# Patient Record
Sex: MALE | Race: WHITE | HISPANIC OR LATINO
[De-identification: names, ages, dates, MRNs, and addresses within clinical notes are randomized per-mention and may not be internally consistent; named-entity substitution may affect disease eponyms.]

---

## 2017-04-19 ENCOUNTER — APPOINTMENT (OUTPATIENT)
Dept: NEUROLOGY | Facility: CLINIC | Age: 82
End: 2017-04-19

## 2017-04-19 VITALS
HEIGHT: 60 IN | HEART RATE: 72 BPM | WEIGHT: 145 LBS | DIASTOLIC BLOOD PRESSURE: 73 MMHG | SYSTOLIC BLOOD PRESSURE: 108 MMHG | BODY MASS INDEX: 28.47 KG/M2

## 2017-04-19 DIAGNOSIS — G20 PARKINSON'S DISEASE: ICD-10-CM

## 2017-08-30 ENCOUNTER — INPATIENT (INPATIENT)
Facility: HOSPITAL | Age: 82
LOS: 4 days | Discharge: HOME | DRG: 690 | End: 2017-09-04
Attending: FAMILY MEDICINE | Admitting: FAMILY MEDICINE
Payer: MEDICARE

## 2017-08-30 VITALS
DIASTOLIC BLOOD PRESSURE: 76 MMHG | TEMPERATURE: 98 F | SYSTOLIC BLOOD PRESSURE: 130 MMHG | RESPIRATION RATE: 17 BRPM | HEART RATE: 86 BPM | OXYGEN SATURATION: 99 % | WEIGHT: 145.06 LBS

## 2017-08-30 DIAGNOSIS — Z90.49 ACQUIRED ABSENCE OF OTHER SPECIFIED PARTS OF DIGESTIVE TRACT: Chronic | ICD-10-CM

## 2017-08-30 RX ORDER — SODIUM CHLORIDE 9 MG/ML
3 INJECTION INTRAMUSCULAR; INTRAVENOUS; SUBCUTANEOUS ONCE
Qty: 0 | Refills: 0 | Status: COMPLETED | OUTPATIENT
Start: 2017-08-30 | End: 2017-08-30

## 2017-08-30 RX ORDER — PANTOPRAZOLE SODIUM 20 MG/1
40 TABLET, DELAYED RELEASE ORAL ONCE
Qty: 0 | Refills: 0 | Status: COMPLETED | OUTPATIENT
Start: 2017-08-30 | End: 2017-08-30

## 2017-08-30 NOTE — ED ADULT NURSE NOTE - PMH
Abdominal pain    Asbestos-induced pleural plaque    BPH (benign prostatic hyperplasia)    Calcified granuloma of lung    Constipation    Coronary artery disease due to calcified coronary lesion    Essential hypertension    Fall, subsequent encounter    HLD (hyperlipidemia)    Parkinsons disease

## 2017-08-31 DIAGNOSIS — N39.0 URINARY TRACT INFECTION, SITE NOT SPECIFIED: ICD-10-CM

## 2017-08-31 DIAGNOSIS — N40.0 BENIGN PROSTATIC HYPERPLASIA WITHOUT LOWER URINARY TRACT SYMPTOMS: ICD-10-CM

## 2017-08-31 DIAGNOSIS — K59.00 CONSTIPATION, UNSPECIFIED: ICD-10-CM

## 2017-08-31 DIAGNOSIS — Z29.9 ENCOUNTER FOR PROPHYLACTIC MEASURES, UNSPECIFIED: ICD-10-CM

## 2017-08-31 DIAGNOSIS — E78.5 HYPERLIPIDEMIA, UNSPECIFIED: ICD-10-CM

## 2017-08-31 DIAGNOSIS — G20 PARKINSON'S DISEASE: ICD-10-CM

## 2017-08-31 DIAGNOSIS — I25.10 ATHEROSCLEROTIC HEART DISEASE OF NATIVE CORONARY ARTERY WITHOUT ANGINA PECTORIS: ICD-10-CM

## 2017-08-31 DIAGNOSIS — R53.1 WEAKNESS: ICD-10-CM

## 2017-08-31 LAB
ALBUMIN SERPL ELPH-MCNC: 3.4 G/DL — LOW (ref 3.5–5)
ALP SERPL-CCNC: 95 U/L — SIGNIFICANT CHANGE UP (ref 40–120)
ALT FLD-CCNC: 11 U/L DA — SIGNIFICANT CHANGE UP (ref 10–60)
ANION GAP SERPL CALC-SCNC: 7 MMOL/L — SIGNIFICANT CHANGE UP (ref 5–17)
APPEARANCE UR: CLEAR — SIGNIFICANT CHANGE UP
AST SERPL-CCNC: 17 U/L — SIGNIFICANT CHANGE UP (ref 10–40)
BASOPHILS # BLD AUTO: 0.1 K/UL — SIGNIFICANT CHANGE UP (ref 0–0.2)
BASOPHILS NFR BLD AUTO: 0.8 % — SIGNIFICANT CHANGE UP (ref 0–2)
BILIRUB SERPL-MCNC: 0.3 MG/DL — SIGNIFICANT CHANGE UP (ref 0.2–1.2)
BILIRUB UR-MCNC: NEGATIVE — SIGNIFICANT CHANGE UP
BUN SERPL-MCNC: 28 MG/DL — HIGH (ref 7–18)
CALCIUM SERPL-MCNC: 8.5 MG/DL — SIGNIFICANT CHANGE UP (ref 8.4–10.5)
CHLORIDE SERPL-SCNC: 107 MMOL/L — SIGNIFICANT CHANGE UP (ref 96–108)
CO2 SERPL-SCNC: 28 MMOL/L — SIGNIFICANT CHANGE UP (ref 22–31)
COLOR SPEC: YELLOW — SIGNIFICANT CHANGE UP
CREAT SERPL-MCNC: 0.96 MG/DL — SIGNIFICANT CHANGE UP (ref 0.5–1.3)
DIFF PNL FLD: ABNORMAL
EOSINOPHIL # BLD AUTO: 0.2 K/UL — SIGNIFICANT CHANGE UP (ref 0–0.5)
EOSINOPHIL NFR BLD AUTO: 3.3 % — SIGNIFICANT CHANGE UP (ref 0–6)
GLUCOSE SERPL-MCNC: 100 MG/DL — HIGH (ref 70–99)
GLUCOSE UR QL: NEGATIVE — SIGNIFICANT CHANGE UP
HCT VFR BLD CALC: 43 % — SIGNIFICANT CHANGE UP (ref 39–50)
HGB BLD-MCNC: 14.2 G/DL — SIGNIFICANT CHANGE UP (ref 13–17)
KETONES UR-MCNC: ABNORMAL
LACTATE SERPL-SCNC: 1.7 MMOL/L — SIGNIFICANT CHANGE UP (ref 0.7–2)
LEUKOCYTE ESTERASE UR-ACNC: ABNORMAL
LIDOCAIN IGE QN: 178 U/L — SIGNIFICANT CHANGE UP (ref 73–393)
LYMPHOCYTES # BLD AUTO: 2.4 K/UL — SIGNIFICANT CHANGE UP (ref 1–3.3)
LYMPHOCYTES # BLD AUTO: 37.4 % — SIGNIFICANT CHANGE UP (ref 13–44)
MCHC RBC-ENTMCNC: 32.8 PG — SIGNIFICANT CHANGE UP (ref 27–34)
MCHC RBC-ENTMCNC: 33 GM/DL — SIGNIFICANT CHANGE UP (ref 32–36)
MCV RBC AUTO: 99.4 FL — SIGNIFICANT CHANGE UP (ref 80–100)
MONOCYTES # BLD AUTO: 0.7 K/UL — SIGNIFICANT CHANGE UP (ref 0–0.9)
MONOCYTES NFR BLD AUTO: 10.9 % — SIGNIFICANT CHANGE UP (ref 2–14)
NEUTROPHILS # BLD AUTO: 3 K/UL — SIGNIFICANT CHANGE UP (ref 1.8–7.4)
NEUTROPHILS NFR BLD AUTO: 47.7 % — SIGNIFICANT CHANGE UP (ref 43–77)
NITRITE UR-MCNC: NEGATIVE — SIGNIFICANT CHANGE UP
PH UR: 6.5 — SIGNIFICANT CHANGE UP (ref 5–8)
PLATELET # BLD AUTO: 171 K/UL — SIGNIFICANT CHANGE UP (ref 150–400)
POTASSIUM SERPL-MCNC: 4.7 MMOL/L — SIGNIFICANT CHANGE UP (ref 3.5–5.3)
POTASSIUM SERPL-SCNC: 4.7 MMOL/L — SIGNIFICANT CHANGE UP (ref 3.5–5.3)
PROT SERPL-MCNC: 7.2 G/DL — SIGNIFICANT CHANGE UP (ref 6–8.3)
PROT UR-MCNC: 15
RBC # BLD: 4.32 M/UL — SIGNIFICANT CHANGE UP (ref 4.2–5.8)
RBC # FLD: 11.4 % — SIGNIFICANT CHANGE UP (ref 10.3–14.5)
SODIUM SERPL-SCNC: 142 MMOL/L — SIGNIFICANT CHANGE UP (ref 135–145)
SP GR SPEC: 1.01 — SIGNIFICANT CHANGE UP (ref 1.01–1.02)
TROPONIN I SERPL-MCNC: <0.015 NG/ML — SIGNIFICANT CHANGE UP (ref 0–0.04)
TROPONIN I SERPL-MCNC: <0.015 NG/ML — SIGNIFICANT CHANGE UP (ref 0–0.04)
UROBILINOGEN FLD QL: NEGATIVE — SIGNIFICANT CHANGE UP
WBC # BLD: 6.4 K/UL — SIGNIFICANT CHANGE UP (ref 3.8–10.5)
WBC # FLD AUTO: 6.4 K/UL — SIGNIFICANT CHANGE UP (ref 3.8–10.5)

## 2017-08-31 PROCEDURE — 99285 EMERGENCY DEPT VISIT HI MDM: CPT | Mod: 25

## 2017-08-31 PROCEDURE — 74177 CT ABD & PELVIS W/CONTRAST: CPT | Mod: 26

## 2017-08-31 RX ORDER — PREGABALIN 225 MG/1
1000 CAPSULE ORAL DAILY
Qty: 0 | Refills: 0 | Status: DISCONTINUED | OUTPATIENT
Start: 2017-08-31 | End: 2017-09-04

## 2017-08-31 RX ORDER — CARBIDOPA AND LEVODOPA 25; 100 MG/1; MG/1
1 TABLET ORAL EVERY 6 HOURS
Qty: 0 | Refills: 0 | Status: DISCONTINUED | OUTPATIENT
Start: 2017-08-31 | End: 2017-09-01

## 2017-08-31 RX ORDER — METOPROLOL TARTRATE 50 MG
25 TABLET ORAL
Qty: 0 | Refills: 0 | Status: DISCONTINUED | OUTPATIENT
Start: 2017-08-31 | End: 2017-09-04

## 2017-08-31 RX ORDER — METOPROLOL TARTRATE 50 MG
1 TABLET ORAL
Qty: 0 | Refills: 0 | COMMUNITY

## 2017-08-31 RX ORDER — CARBIDOPA AND LEVODOPA 25; 100 MG/1; MG/1
1 TABLET ORAL
Qty: 0 | Refills: 0 | COMMUNITY

## 2017-08-31 RX ORDER — LACTULOSE 10 G/15ML
10 SOLUTION ORAL DAILY
Qty: 0 | Refills: 0 | Status: DISCONTINUED | OUTPATIENT
Start: 2017-08-31 | End: 2017-08-31

## 2017-08-31 RX ORDER — SENNA PLUS 8.6 MG/1
2 TABLET ORAL AT BEDTIME
Qty: 0 | Refills: 0 | Status: DISCONTINUED | OUTPATIENT
Start: 2017-08-31 | End: 2017-09-04

## 2017-08-31 RX ORDER — SODIUM CHLORIDE 9 MG/ML
1000 INJECTION INTRAMUSCULAR; INTRAVENOUS; SUBCUTANEOUS ONCE
Qty: 0 | Refills: 0 | Status: COMPLETED | OUTPATIENT
Start: 2017-08-31 | End: 2017-08-31

## 2017-08-31 RX ORDER — CEFTRIAXONE 500 MG/1
1 INJECTION, POWDER, FOR SOLUTION INTRAMUSCULAR; INTRAVENOUS EVERY 24 HOURS
Qty: 0 | Refills: 0 | Status: DISCONTINUED | OUTPATIENT
Start: 2017-08-31 | End: 2017-09-01

## 2017-08-31 RX ORDER — DULOXETINE HYDROCHLORIDE 30 MG/1
30 CAPSULE, DELAYED RELEASE ORAL DAILY
Qty: 0 | Refills: 0 | Status: DISCONTINUED | OUTPATIENT
Start: 2017-08-31 | End: 2017-09-04

## 2017-08-31 RX ORDER — LACTULOSE 10 G/15ML
10 SOLUTION ORAL DAILY
Qty: 0 | Refills: 0 | Status: DISCONTINUED | OUTPATIENT
Start: 2017-08-31 | End: 2017-09-04

## 2017-08-31 RX ORDER — DOCUSATE SODIUM 100 MG
100 CAPSULE ORAL
Qty: 0 | Refills: 0 | Status: DISCONTINUED | OUTPATIENT
Start: 2017-08-31 | End: 2017-09-04

## 2017-08-31 RX ORDER — ENOXAPARIN SODIUM 100 MG/ML
40 INJECTION SUBCUTANEOUS EVERY 24 HOURS
Qty: 0 | Refills: 0 | Status: DISCONTINUED | OUTPATIENT
Start: 2017-08-31 | End: 2017-09-04

## 2017-08-31 RX ORDER — SODIUM CHLORIDE 9 MG/ML
1000 INJECTION INTRAMUSCULAR; INTRAVENOUS; SUBCUTANEOUS
Qty: 0 | Refills: 0 | Status: DISCONTINUED | OUTPATIENT
Start: 2017-08-31 | End: 2017-09-04

## 2017-08-31 RX ORDER — ATORVASTATIN CALCIUM 80 MG/1
20 TABLET, FILM COATED ORAL AT BEDTIME
Qty: 0 | Refills: 0 | Status: DISCONTINUED | OUTPATIENT
Start: 2017-08-31 | End: 2017-09-04

## 2017-08-31 RX ORDER — CEFTRIAXONE 500 MG/1
1 INJECTION, POWDER, FOR SOLUTION INTRAMUSCULAR; INTRAVENOUS ONCE
Qty: 0 | Refills: 0 | Status: COMPLETED | OUTPATIENT
Start: 2017-08-31 | End: 2017-08-31

## 2017-08-31 RX ORDER — FAMOTIDINE 10 MG/ML
20 INJECTION INTRAVENOUS
Qty: 0 | Refills: 0 | Status: DISCONTINUED | OUTPATIENT
Start: 2017-08-31 | End: 2017-09-04

## 2017-08-31 RX ORDER — FOLIC ACID 0.8 MG
1 TABLET ORAL DAILY
Qty: 0 | Refills: 0 | Status: DISCONTINUED | OUTPATIENT
Start: 2017-08-31 | End: 2017-09-04

## 2017-08-31 RX ORDER — ALPRAZOLAM 0.25 MG
0.25 TABLET ORAL THREE TIMES A DAY
Qty: 0 | Refills: 0 | Status: DISCONTINUED | OUTPATIENT
Start: 2017-08-31 | End: 2017-09-04

## 2017-08-31 RX ORDER — METOPROLOL TARTRATE 50 MG
25 TABLET ORAL
Qty: 0 | Refills: 0 | Status: DISCONTINUED | OUTPATIENT
Start: 2017-08-31 | End: 2017-08-31

## 2017-08-31 RX ORDER — MORPHINE SULFATE 50 MG/1
2 CAPSULE, EXTENDED RELEASE ORAL ONCE
Qty: 0 | Refills: 0 | Status: DISCONTINUED | OUTPATIENT
Start: 2017-08-31 | End: 2017-08-31

## 2017-08-31 RX ADMIN — SODIUM CHLORIDE 60 MILLILITER(S): 9 INJECTION INTRAMUSCULAR; INTRAVENOUS; SUBCUTANEOUS at 08:14

## 2017-08-31 RX ADMIN — LACTULOSE 10 GRAM(S): 10 SOLUTION ORAL at 12:01

## 2017-08-31 RX ADMIN — FAMOTIDINE 20 MILLIGRAM(S): 10 INJECTION INTRAVENOUS at 19:01

## 2017-08-31 RX ADMIN — MORPHINE SULFATE 2 MILLIGRAM(S): 50 CAPSULE, EXTENDED RELEASE ORAL at 00:34

## 2017-08-31 RX ADMIN — FAMOTIDINE 20 MILLIGRAM(S): 10 INJECTION INTRAVENOUS at 06:15

## 2017-08-31 RX ADMIN — Medication 25 MILLIGRAM(S): at 06:15

## 2017-08-31 RX ADMIN — ENOXAPARIN SODIUM 40 MILLIGRAM(S): 100 INJECTION SUBCUTANEOUS at 06:15

## 2017-08-31 RX ADMIN — SENNA PLUS 2 TABLET(S): 8.6 TABLET ORAL at 21:14

## 2017-08-31 RX ADMIN — CEFTRIAXONE 100 GRAM(S): 500 INJECTION, POWDER, FOR SOLUTION INTRAMUSCULAR; INTRAVENOUS at 02:30

## 2017-08-31 RX ADMIN — ATORVASTATIN CALCIUM 20 MILLIGRAM(S): 80 TABLET, FILM COATED ORAL at 21:14

## 2017-08-31 RX ADMIN — MORPHINE SULFATE 2 MILLIGRAM(S): 50 CAPSULE, EXTENDED RELEASE ORAL at 02:23

## 2017-08-31 RX ADMIN — SODIUM CHLORIDE 3 MILLILITER(S): 9 INJECTION INTRAMUSCULAR; INTRAVENOUS; SUBCUTANEOUS at 00:32

## 2017-08-31 RX ADMIN — CARBIDOPA AND LEVODOPA 1 TABLET(S): 25; 100 TABLET ORAL at 18:15

## 2017-08-31 RX ADMIN — DULOXETINE HYDROCHLORIDE 30 MILLIGRAM(S): 30 CAPSULE, DELAYED RELEASE ORAL at 12:00

## 2017-08-31 RX ADMIN — CEFTRIAXONE 100 GRAM(S): 500 INJECTION, POWDER, FOR SOLUTION INTRAMUSCULAR; INTRAVENOUS at 08:15

## 2017-08-31 RX ADMIN — SODIUM CHLORIDE 60 MILLILITER(S): 9 INJECTION INTRAMUSCULAR; INTRAVENOUS; SUBCUTANEOUS at 18:13

## 2017-08-31 RX ADMIN — PANTOPRAZOLE SODIUM 40 MILLIGRAM(S): 20 TABLET, DELAYED RELEASE ORAL at 00:34

## 2017-08-31 RX ADMIN — CARBIDOPA AND LEVODOPA 1 TABLET(S): 25; 100 TABLET ORAL at 12:01

## 2017-08-31 RX ADMIN — PREGABALIN 1000 MICROGRAM(S): 225 CAPSULE ORAL at 12:00

## 2017-08-31 RX ADMIN — CARBIDOPA AND LEVODOPA 1 TABLET(S): 25; 100 TABLET ORAL at 06:15

## 2017-08-31 RX ADMIN — Medication 100 MILLIGRAM(S): at 06:15

## 2017-08-31 RX ADMIN — SODIUM CHLORIDE 3000 MILLILITER(S): 9 INJECTION INTRAMUSCULAR; INTRAVENOUS; SUBCUTANEOUS at 03:24

## 2017-08-31 RX ADMIN — Medication 100 MILLIGRAM(S): at 18:14

## 2017-08-31 RX ADMIN — SODIUM CHLORIDE 60 MILLILITER(S): 9 INJECTION INTRAMUSCULAR; INTRAVENOUS; SUBCUTANEOUS at 12:01

## 2017-08-31 RX ADMIN — Medication 25 MILLIGRAM(S): at 18:14

## 2017-08-31 RX ADMIN — Medication 1 MILLIGRAM(S): at 12:01

## 2017-08-31 NOTE — ED PROVIDER NOTE - CARE PLAN
Principal Discharge DX:	Urinary tract infection without hematuria, site unspecified  Secondary Diagnosis:	Generalized weakness  Secondary Diagnosis:	Constipation, unspecified constipation type

## 2017-08-31 NOTE — H&P ADULT - HISTORY OF PRESENT ILLNESS
Pt is an 86 year old male with PMH significant for HLD, Parkinson's, anxiety, constipation and PSHx of cholecystectomy presents to ED c/o abd pain  for 2 days. Pt describes it in the lower abdominal region as a cramping pain, and states he has constipation with his last BM 2 days ago. According to wife, she gave him milk of magnesia with no improvement of symptoms. Today, pt states he became very weak. He usually walks w/ cane but was unable to stand up on own today. Denies any fever, headache, nausea, vomiting, diarrhea, shortness of breath Pt is an 86 year old male with PMH significant for HLD, Parkinson's, anxiety, constipation and PSHx of cholecystectomy presents to ED c/o abd pain  for 2 days. Pt describes it in the lower abdominal region as a cramping pain, and states he has constipation with his last BM 2 days ago. According to wife, she gave him milk of magnesia with no improvement of symptoms. Today, pt states he became very weak. He usually walks w/ cane but was unable to stand up on own today. Denies any fever, headache, nausea, vomiting, diarrhea, shortness of breath.    In ED, received 1 L NS bolus and s/p 1 dose of rocephin Pt is an 86 year old male with PMH significant for HLD, Parkinson's, anxiety, BPH, constipation and PSHx of cholecystectomy presents to ED c/o abd pain  for 2 days. Pt describes it in the lower abdominal region as a cramping pain, and states he has constipation with his last BM 2 days ago. According to wife, she gave him milk of magnesia with no improvement of symptoms. Today, pt states he became very weak. He usually walks w/ cane but was unable to stand up on own today. Denies any fever, headache, nausea, vomiting, diarrhea, shortness of breath.    In ED, received 1 L NS bolus and s/p 1 dose of rocephin

## 2017-08-31 NOTE — PROGRESS NOTE ADULT - SUBJECTIVE AND OBJECTIVE BOX
Patient is a 86y old  Male who presents with a chief complaint of abdominal pain for 2 days (31 Aug 2017 04:40)      INTERVAL HPI/OVERNIGHT EVENTS: no acute overnight events. On encounter patient states that abdominal pain is decreasing. Family at bedside.     I&O's Summary    Vital Signs Last 24 Hrs  T(C): 36.2 (31 Aug 2017 11:39), Max: 36.9 (31 Aug 2017 05:58)  T(F): 97.2 (31 Aug 2017 11:39), Max: 98.5 (31 Aug 2017 05:58)  HR: 101 (31 Aug 2017 11:39) (86 - 101)  BP: 149/81 (31 Aug 2017 11:39) (130/76 - 149/81)  BP(mean): --  RR: 17 (31 Aug 2017 11:39) (17 - 17)  SpO2: 98% (31 Aug 2017 11:39) (98% - 99%)  PAST MEDICAL & SURGICAL HISTORY:  Abdominal pain  Calcified granuloma of lung  Asbestos-induced pleural plaque  Constipation  BPH (benign prostatic hyperplasia)  Coronary artery disease due to calcified coronary lesion  HLD (hyperlipidemia)  Fall, subsequent encounter  Essential hypertension  Parkinsons disease  History of cholecystectomy      SOCIAL HISTORY  Alcohol:  Tobacco:  Illicit substance use:      FAMILY HISTORY:      LABS:                        14.2   6.4   )-----------( 171      ( 31 Aug 2017 00:25 )             43.0     08-31    142  |  107  |  28<H>  ----------------------------<  100<H>  4.7   |  28  |  0.96    Ca    8.5      31 Aug 2017 00:25    TPro  7.2  /  Alb  3.4<L>  /  TBili  0.3  /  DBili  x   /  AST  17  /  ALT  11  /  AlkPhos  95  08-31      CAPILLARY BLOOD GLUCOSE  119 (31 Aug 2017 11:39)            Urinalysis Basic - ( 31 Aug 2017 00:25 )    Color: Yellow / Appearance: Clear / S.015 / pH: x  Gluc: x / Ketone: Trace  / Bili: Negative / Urobili: Negative   Blood: x / Protein: 15 / Nitrite: Negative   Leuk Esterase: Moderate / RBC: 2-5 /HPF / WBC 26-50 /HPF   Sq Epi: x / Non Sq Epi: Few / Bacteria: Few /HPF        MEDICATIONS  (STANDING):  sodium chloride 0.9%. 1000 milliLiter(s) (60 mL/Hr) IV Continuous <Continuous>  DULoxetine 30 milliGRAM(s) Oral daily  atorvastatin 20 milliGRAM(s) Oral at bedtime  carbidopa/levodopa CR 50/200 1 Tablet(s) Oral every 6 hours  famotidine    Tablet 20 milliGRAM(s) Oral two times a day  docusate sodium 100 milliGRAM(s) Oral two times a day  senna 2 Tablet(s) Oral at bedtime  cyanocobalamin 1000 MICROGram(s) Oral daily  folic acid 1 milliGRAM(s) Oral daily  enoxaparin Injectable 40 milliGRAM(s) SubCutaneous every 24 hours  metoprolol succinate ER 25 milliGRAM(s) Oral two times a day  cefTRIAXone   IVPB 1 Gram(s) IV Intermittent every 24 hours  lactulose Syrup 10 Gram(s) Oral daily    MEDICATIONS  (PRN):  ALPRAZolam 0.25 milliGRAM(s) Oral three times a day PRN anxiety/agitation      REVIEW OF SYSTEMS:  CONSTITUTIONAL: No fever, weight loss, or fatigue  EYES: No eye pain, visual disturbances, or discharge  ENMT:  No difficulty hearing, tinnitus, vertigo; No sinus or throat pain  NECK: No pain or stiffness  RESPIRATORY: No cough, wheezing, chills or hemoptysis; No shortness of breath  CARDIOVASCULAR: No chest pain, palpitations, dizziness, or leg swelling  GASTROINTESTINAL: No abdominal or epigastric pain. No nausea, vomiting, or hematemesis; No diarrhea or constipation. No melena or hematochezia.  GENITOURINARY: No dysuria, frequency, hematuria, or incontinence  NEUROLOGICAL: No headaches, memory loss, loss of strength, numbness, or tremors  SKIN: No itching, burning, rashes, or lesions   LYMPH NODES: No enlarged glands  ENDOCRINE: No heat or cold intolerance; No hair loss  MUSCULOSKELETAL: No joint pain or swelling; No muscle, back, or extremity pain  PSYCHIATRIC: No depression, anxiety, mood swings, or difficulty sleeping  HEME/LYMPH: No easy bruising, or bleeding gums  ALLERGY AND IMMUNOLOGIC: No hives or eczema    RADIOLOGY & ADDITIONAL TESTS:    Imaging Personally Reviewed:  [ ] YES  [ ] NO    Consultant(s) Notes Reviewed:  [ ] YES  [ ] NO    PHYSICAL EXAM:  GENERAL: NAD, well-groomed, well-developed  HEAD:  Atraumatic, Normocephalic  EYES: EOMI, PERRLA, conjunctiva and sclera clear  ENMT: No tonsillar erythema, exudates, or enlargement; Moist mucous membranes, Good dentition, No lesions  NECK: Supple, No JVD, Normal thyroid  NERVOUS SYSTEM:  Alert & Oriented X3, Good concentration; Motor Strength 5/5 B/L upper and lower extremities; DTRs 2+ intact and symmetric  CHEST/LUNG: Clear to percussion bilaterally; No rales, rhonchi, wheezing, or rubs  HEART: Regular rate and rhythm; No murmurs, rubs, or gallops  ABDOMEN: Soft, Nontender, Nondistended; Bowel sounds present  EXTREMITIES:  2+ Peripheral Pulses, No clubbing, cyanosis, or edema  LYMPH: No lymphadenopathy noted  SKIN: No rashes or lesions    Care Collaborated Discussed with Consultants/Other Providers [ ] YES  [ ] NO Patient is a 86y old  Male who presents with a chief complaint of abdominal pain for 2 days (31 Aug 2017 04:40)      INTERVAL HPI/OVERNIGHT EVENTS: no acute overnight events. On encounter patient states that abdominal pain is decreasing. Family at bedside.     I&O's Summary    Vital Signs Last 24 Hrs  T(C): 36.2 (31 Aug 2017 11:39), Max: 36.9 (31 Aug 2017 05:58)  T(F): 97.2 (31 Aug 2017 11:39), Max: 98.5 (31 Aug 2017 05:58)  HR: 101 (31 Aug 2017 11:39) (86 - 101)  BP: 149/81 (31 Aug 2017 11:39) (130/76 - 149/81)  BP(mean): --  RR: 17 (31 Aug 2017 11:39) (17 - 17)  SpO2: 98% (31 Aug 2017 11:39) (98% - 99%)  PAST MEDICAL & SURGICAL HISTORY:  Abdominal pain  Calcified granuloma of lung  Asbestos-induced pleural plaque  Constipation  BPH (benign prostatic hyperplasia)  Coronary artery disease due to calcified coronary lesion  HLD (hyperlipidemia)  Fall, subsequent encounter  Essential hypertension  Parkinsons disease  History of cholecystectomy        LABS:                        14.2   6.4   )-----------( 171      ( 31 Aug 2017 00:25 )             43.0     08-31    142  |  107  |  28<H>  ----------------------------<  100<H>  4.7   |  28  |  0.96    Ca    8.5      31 Aug 2017 00:25    TPro  7.2  /  Alb  3.4<L>  /  TBili  0.3  /  DBili  x   /  AST  17  /  ALT  11  /  AlkPhos  95  08-31      CAPILLARY BLOOD GLUCOSE  119 (31 Aug 2017 11:39)            Urinalysis Basic - ( 31 Aug 2017 00:25 )    Color: Yellow / Appearance: Clear / S.015 / pH: x  Gluc: x / Ketone: Trace  / Bili: Negative / Urobili: Negative   Blood: x / Protein: 15 / Nitrite: Negative   Leuk Esterase: Moderate / RBC: 2-5 /HPF / WBC 26-50 /HPF   Sq Epi: x / Non Sq Epi: Few / Bacteria: Few /HPF        MEDICATIONS  (STANDING):  sodium chloride 0.9%. 1000 milliLiter(s) (60 mL/Hr) IV Continuous <Continuous>  DULoxetine 30 milliGRAM(s) Oral daily  atorvastatin 20 milliGRAM(s) Oral at bedtime  carbidopa/levodopa CR 50/200 1 Tablet(s) Oral every 6 hours  famotidine    Tablet 20 milliGRAM(s) Oral two times a day  docusate sodium 100 milliGRAM(s) Oral two times a day  senna 2 Tablet(s) Oral at bedtime  cyanocobalamin 1000 MICROGram(s) Oral daily  folic acid 1 milliGRAM(s) Oral daily  enoxaparin Injectable 40 milliGRAM(s) SubCutaneous every 24 hours  metoprolol succinate ER 25 milliGRAM(s) Oral two times a day  cefTRIAXone   IVPB 1 Gram(s) IV Intermittent every 24 hours  lactulose Syrup 10 Gram(s) Oral daily    MEDICATIONS  (PRN):  ALPRAZolam 0.25 milliGRAM(s) Oral three times a day PRN anxiety/agitation      REVIEW OF SYSTEMS:  CONSTITUTIONAL: No fever, weight loss, or fatigue  EYES: No eye pain, visual disturbances, or discharge  ENMT:  No difficulty hearing, tinnitus, vertigo; No sinus or throat pain  NECK: No pain or stiffness  RESPIRATORY: No cough, wheezing, chills or hemoptysis; No shortness of breath  CARDIOVASCULAR: No chest pain, palpitations, dizziness, or leg swelling  GASTROINTESTINAL: +abdominal pain  No nausea, vomiting, or hematemesis; No diarrhea or constipation. No melena or hematochezia.  GENITOURINARY: No dysuria, frequency, hematuria, or incontinence  NEUROLOGICAL: No headaches, memory loss, loss of strength, numbness, or tremors  SKIN: No itching, burning, rashes, or lesions   LYMPH NODES: No enlarged glands  ENDOCRINE: No heat or cold intolerance; No hair loss  MUSCULOSKELETAL: No joint pain or swelling; No muscle, back, or extremity pain  PSYCHIATRIC: No depression, anxiety, mood swings, or difficulty sleeping  HEME/LYMPH: No easy bruising, or bleeding gums  ALLERGY AND IMMUNOLOGIC: No hives or eczema    RADIOLOGY & ADDITIONAL TESTS:    Imaging Personally Reviewed:  [ ] YES  [ ] NO    Consultant(s) Notes Reviewed:  [ ] YES  [ ] NO    PHYSICAL EXAM:  GENERAL: NAD, well-groomed, well-developed  HEAD:  Atraumatic, Normocephalic  EYES: EOMI, PERRLA, conjunctiva and sclera clear  ENMT: No tonsillar erythema, exudates, or enlargement; Moist mucous membranes, Good dentition, No lesions  NECK: Supple, No JVD, Normal thyroid  NERVOUS SYSTEM:  Alert & Oriented X3, Good concentration; Motor Strength 5/5 B/L upper and lower extremities; DTRs 2+ intact and symmetric  CHEST/LUNG: Clear to percussion bilaterally; No rales, rhonchi, wheezing, or rubs  HEART: Regular rate and rhythm; No murmurs, rubs, or gallops  ABDOMEN: Soft, Nontender, Nondistended; Bowel sounds present  EXTREMITIES:  2+ Peripheral Pulses, No clubbing, cyanosis, or edema  LYMPH: No lymphadenopathy noted  SKIN: No rashes or lesions    Care Collaborated Discussed with Consultants/Other Providers [ ] YES  [ ] NO

## 2017-08-31 NOTE — H&P ADULT - PROBLEM SELECTOR PLAN 2
continue with senna, colace,  will give enema continue with senna, colace, lactulose  tap water enema STAT  may also help with symptoms of UTI

## 2017-08-31 NOTE — ED PROVIDER NOTE - PROGRESS NOTE DETAILS
labs show wbc wnl, trop wnl  UA cw UTI-given ceftriaxone  CT A/P shows moderate stool throughout colon, no acute abnormality

## 2017-08-31 NOTE — ED PROVIDER NOTE - OBJECTIVE STATEMENT
85 y/o M w/ PMHx of HLD, Parkinson's, anxiety, constipation and PSHx of cholecystectomy presents to ED c/o abd pain x earlier today. Denies any fever, vomiting, or diarrhea. Pt states last BM was 2 days ago. Wife reports she gave him milk of magnesia w/ no improvement of constipation. Today, pt states he became very weak. He usually walks w/ cane but was unable to stand up on own today. NKDA.

## 2017-08-31 NOTE — H&P ADULT - NSHPLABSRESULTS_GEN_ALL_CORE
LABS:      CBC Full  -  ( 31 Aug 2017 00:25 )  WBC Count : 6.4 K/uL  Hemoglobin : 14.2 g/dL  Hematocrit : 43.0 %  Platelet Count - Automated : 171 K/uL  Mean Cell Volume : 99.4 fl  Mean Cell Hemoglobin : 32.8 pg  Mean Cell Hemoglobin Concentration : 33.0 gm/dL  Auto Neutrophil # : 3.0 K/uL  Auto Lymphocyte # : 2.4 K/uL  Auto Monocyte # : 0.7 K/uL  Auto Eosinophil # : 0.2 K/uL  Auto Basophil # : 0.1 K/uL  Auto Neutrophil % : 47.7 %  Auto Lymphocyte % : 37.4 %  Auto Monocyte % : 10.9 %  Auto Eosinophil % : 3.3 %  Auto Basophil % : 0.8 %        142  |  107  |  28<H>  ----------------------------<  100<H>  4.7   |  28  |  0.96    Ca    8.5      31 Aug 2017 00:25    TPro  7.2  /  Alb  3.4<L>  /  TBili  0.3  /  DBili  x   /  AST  17  /  ALT  11  /  AlkPhos  95            Urinalysis Basic - ( 31 Aug 2017 00:25 )    Color: Yellow / Appearance: Clear / S.015 / pH: x  Gluc: x / Ketone: Trace  / Bili: Negative / Urobili: Negative   Blood: x / Protein: 15 / Nitrite: Negative   Leuk Esterase: Moderate / RBC: 2-5 /HPF / WBC 26-50 /HPF   Sq Epi: x / Non Sq Epi: Few / Bacteria: Few /HPF                RADIOLOGY & ADDITIONAL STUDIES (The following images were personally reviewed):

## 2017-08-31 NOTE — H&P ADULT - MOTOR
resting tremor noted on ankles bilaterally  normal strength in all 4 extremities  cogwheel rigidity unappreciable

## 2017-08-31 NOTE — H&P ADULT - PROBLEM SELECTOR PLAN 3
may be 2/2 UTI, or exacerbated Parkinson's disease  will continue to treat for UTI and monitor for improvement of weakness  c/w Parkinson's medications home dose

## 2017-08-31 NOTE — H&P ADULT - PROBLEM SELECTOR PLAN 1
UA with presence of WBCs, nitrites, LE  pt afebrile on admission, denies fevers at home  no appreciable WC  pt complains of ongoing, crampy, abdominal pain in setting of BPH  will treat for UTI  f/u urine cultures UA with presence of WBCs, nitrites, LE  pt afebrile on admission, denies fevers at home  no appreciable WC  pt complains of ongoing, crampy, abdominal pain in setting of BPH  will treat for UTI  c/w Rocpephin for now  f/u urine cultures and sensitivity UA with presence of WBCs, nitrites, LE  pt afebrile on admission, denies fevers at home  no appreciable WC  pt complains of ongoing, crampy, abdominal pain in setting of BPH and constipation could both be secondary causes for urinary stasis, responsible for UTI  will continue to treat with Rocephin(dose 2 today) for now, about 7-10 doses total  giving enema as well  f/u urine cultures and sensitivity

## 2017-08-31 NOTE — ED PROVIDER NOTE - MEDICAL DECISION MAKING DETAILS
85 y/o M w/ h/o constipation presents w/ abd pain. Will obtain EKG, labs, UA, CT-scan abd and re-assess. Pt has been given Morphine for pain.

## 2017-08-31 NOTE — H&P ADULT - ASSESSMENT
Pt is a 76 year old male with PMH significant for HLD, Parkinson's, anxiety, constipation and PSHx of cholecystectomy presents to ED c/o abd pain  for 2 days. Pt describes it in the lower abdominal region as a cramping pain, and states he has constipation with his last BM 2 days ago. According to wife, she gave him milk of magnesia with no improvement of symptoms. Today, pt states he became very weak. He usually walks w/ cane but was unable to stand up on own today. UA positive for UTI. Pt afebrile at this time but complaining of crampy abdominal pain without evidence of nausea, vomiting, diarrhea. CT abdomen/pelvis with IV/PO contrast reveals no grosss abnormalities other than presence of diverticulosis. Pt admitted for UTI. Pt is a 76 year old male with PMH significant for HLD, Parkinson's, anxiety, BPH, constipation and PSHx of cholecystectomy presents to ED c/o abd pain  for 2 days. Pt describes it in the lower abdominal region as a cramping pain, and states he has constipation with his last BM 2 days ago. According to wife, she gave him milk of magnesia with no improvement of symptoms. Today, pt states he became very weak. He usually walks w/ cane but was unable to stand up on own today. UA positive for UTI. Pt afebrile at this time but complaining of crampy abdominal pain without evidence of nausea, vomiting, diarrhea. CT abdomen/pelvis with IV/PO contrast reveals no grosss abnormalities other than presence of diverticulosis. Pt admitted for UTI.

## 2017-09-01 LAB
ALBUMIN SERPL ELPH-MCNC: 3.1 G/DL — LOW (ref 3.5–5)
ALP SERPL-CCNC: 74 U/L — SIGNIFICANT CHANGE UP (ref 40–120)
ALT FLD-CCNC: 12 U/L DA — SIGNIFICANT CHANGE UP (ref 10–60)
ANION GAP SERPL CALC-SCNC: 9 MMOL/L — SIGNIFICANT CHANGE UP (ref 5–17)
AST SERPL-CCNC: 20 U/L — SIGNIFICANT CHANGE UP (ref 10–40)
BASOPHILS # BLD AUTO: 0.1 K/UL — SIGNIFICANT CHANGE UP (ref 0–0.2)
BASOPHILS NFR BLD AUTO: 1.4 % — SIGNIFICANT CHANGE UP (ref 0–2)
BILIRUB SERPL-MCNC: 0.8 MG/DL — SIGNIFICANT CHANGE UP (ref 0.2–1.2)
BUN SERPL-MCNC: 22 MG/DL — HIGH (ref 7–18)
CALCIUM SERPL-MCNC: 8.5 MG/DL — SIGNIFICANT CHANGE UP (ref 8.4–10.5)
CHLORIDE SERPL-SCNC: 109 MMOL/L — HIGH (ref 96–108)
CO2 SERPL-SCNC: 25 MMOL/L — SIGNIFICANT CHANGE UP (ref 22–31)
CREAT SERPL-MCNC: 0.83 MG/DL — SIGNIFICANT CHANGE UP (ref 0.5–1.3)
EOSINOPHIL # BLD AUTO: 0.1 K/UL — SIGNIFICANT CHANGE UP (ref 0–0.5)
EOSINOPHIL NFR BLD AUTO: 1.7 % — SIGNIFICANT CHANGE UP (ref 0–6)
GLUCOSE SERPL-MCNC: 105 MG/DL — HIGH (ref 70–99)
HCT VFR BLD CALC: 42.4 % — SIGNIFICANT CHANGE UP (ref 39–50)
HGB BLD-MCNC: 13.9 G/DL — SIGNIFICANT CHANGE UP (ref 13–17)
LYMPHOCYTES # BLD AUTO: 1.4 K/UL — SIGNIFICANT CHANGE UP (ref 1–3.3)
LYMPHOCYTES # BLD AUTO: 22 % — SIGNIFICANT CHANGE UP (ref 13–44)
MAGNESIUM SERPL-MCNC: 2.3 MG/DL — SIGNIFICANT CHANGE UP (ref 1.6–2.6)
MCHC RBC-ENTMCNC: 32.8 GM/DL — SIGNIFICANT CHANGE UP (ref 32–36)
MCHC RBC-ENTMCNC: 32.9 PG — SIGNIFICANT CHANGE UP (ref 27–34)
MCV RBC AUTO: 100.3 FL — HIGH (ref 80–100)
MONOCYTES # BLD AUTO: 0.7 K/UL — SIGNIFICANT CHANGE UP (ref 0–0.9)
MONOCYTES NFR BLD AUTO: 10.1 % — SIGNIFICANT CHANGE UP (ref 2–14)
NEUTROPHILS # BLD AUTO: 4.3 K/UL — SIGNIFICANT CHANGE UP (ref 1.8–7.4)
NEUTROPHILS NFR BLD AUTO: 64.8 % — SIGNIFICANT CHANGE UP (ref 43–77)
PHOSPHATE SERPL-MCNC: 2.5 MG/DL — SIGNIFICANT CHANGE UP (ref 2.5–4.5)
PLATELET # BLD AUTO: 178 K/UL — SIGNIFICANT CHANGE UP (ref 150–400)
POTASSIUM SERPL-MCNC: 4.1 MMOL/L — SIGNIFICANT CHANGE UP (ref 3.5–5.3)
POTASSIUM SERPL-SCNC: 4.1 MMOL/L — SIGNIFICANT CHANGE UP (ref 3.5–5.3)
PROT SERPL-MCNC: 6.9 G/DL — SIGNIFICANT CHANGE UP (ref 6–8.3)
RBC # BLD: 4.23 M/UL — SIGNIFICANT CHANGE UP (ref 4.2–5.8)
RBC # FLD: 11.7 % — SIGNIFICANT CHANGE UP (ref 10.3–14.5)
SODIUM SERPL-SCNC: 143 MMOL/L — SIGNIFICANT CHANGE UP (ref 135–145)
WBC # BLD: 6.6 K/UL — SIGNIFICANT CHANGE UP (ref 3.8–10.5)
WBC # FLD AUTO: 6.6 K/UL — SIGNIFICANT CHANGE UP (ref 3.8–10.5)

## 2017-09-01 RX ORDER — CARBIDOPA AND LEVODOPA 25; 100 MG/1; MG/1
1 TABLET ORAL EVERY 6 HOURS
Qty: 0 | Refills: 0 | Status: DISCONTINUED | OUTPATIENT
Start: 2017-09-01 | End: 2017-09-04

## 2017-09-01 RX ADMIN — SODIUM CHLORIDE 60 MILLILITER(S): 9 INJECTION INTRAMUSCULAR; INTRAVENOUS; SUBCUTANEOUS at 22:05

## 2017-09-01 RX ADMIN — CARBIDOPA AND LEVODOPA 1 TABLET(S): 25; 100 TABLET ORAL at 18:00

## 2017-09-01 RX ADMIN — CARBIDOPA AND LEVODOPA 1 TABLET(S): 25; 100 TABLET ORAL at 06:13

## 2017-09-01 RX ADMIN — Medication 100 MILLIGRAM(S): at 06:14

## 2017-09-01 RX ADMIN — PREGABALIN 1000 MICROGRAM(S): 225 CAPSULE ORAL at 13:47

## 2017-09-01 RX ADMIN — ATORVASTATIN CALCIUM 20 MILLIGRAM(S): 80 TABLET, FILM COATED ORAL at 22:05

## 2017-09-01 RX ADMIN — Medication 100 MILLIGRAM(S): at 18:01

## 2017-09-01 RX ADMIN — Medication 1 MILLIGRAM(S): at 13:47

## 2017-09-01 RX ADMIN — FAMOTIDINE 20 MILLIGRAM(S): 10 INJECTION INTRAVENOUS at 18:00

## 2017-09-01 RX ADMIN — LACTULOSE 10 GRAM(S): 10 SOLUTION ORAL at 17:46

## 2017-09-01 RX ADMIN — CARBIDOPA AND LEVODOPA 1 TABLET(S): 25; 100 TABLET ORAL at 23:20

## 2017-09-01 RX ADMIN — CARBIDOPA AND LEVODOPA 1 TABLET(S): 25; 100 TABLET ORAL at 12:29

## 2017-09-01 RX ADMIN — SODIUM CHLORIDE 60 MILLILITER(S): 9 INJECTION INTRAMUSCULAR; INTRAVENOUS; SUBCUTANEOUS at 22:10

## 2017-09-01 RX ADMIN — Medication 25 MILLIGRAM(S): at 06:14

## 2017-09-01 RX ADMIN — FAMOTIDINE 20 MILLIGRAM(S): 10 INJECTION INTRAVENOUS at 06:14

## 2017-09-01 RX ADMIN — SENNA PLUS 2 TABLET(S): 8.6 TABLET ORAL at 22:05

## 2017-09-01 RX ADMIN — ENOXAPARIN SODIUM 40 MILLIGRAM(S): 100 INJECTION SUBCUTANEOUS at 06:14

## 2017-09-01 RX ADMIN — CARBIDOPA AND LEVODOPA 1 TABLET(S): 25; 100 TABLET ORAL at 00:40

## 2017-09-01 RX ADMIN — DULOXETINE HYDROCHLORIDE 30 MILLIGRAM(S): 30 CAPSULE, DELAYED RELEASE ORAL at 13:47

## 2017-09-01 RX ADMIN — CEFTRIAXONE 100 GRAM(S): 500 INJECTION, POWDER, FOR SOLUTION INTRAMUSCULAR; INTRAVENOUS at 12:29

## 2017-09-01 RX ADMIN — Medication 0.25 MILLIGRAM(S): at 22:09

## 2017-09-01 NOTE — [facility]VANCED PRACTICE NURSE CONSULT - ASSESSMENT
This is a 86yr old male patient admitted for UTI, to which upon assessment, the patients skin is intact

## 2017-09-01 NOTE — PROGRESS NOTE ADULT - SUBJECTIVE AND OBJECTIVE BOX
PGY 1 Note discussed with supervising resident and primary attending    Patient is a 86y old  Male who presents with a chief complaint of abdominal pain for 2 days (31 Aug 2017 04:40)    INTERVAL HPI/OVERNIGHT EVENTS: Patient seen and examined at bedside with no new complaints, voiding and +BM, tolerating diet, no abdominal pain - plan to discontinue ABx as it is unlikely source of abdominal pain and possible discharge tomorrow if clinically stable    MEDICATIONS  (STANDING):  sodium chloride 0.9%. 1000 milliLiter(s) (60 mL/Hr) IV Continuous <Continuous>  DULoxetine 30 milliGRAM(s) Oral daily  atorvastatin 20 milliGRAM(s) Oral at bedtime  famotidine    Tablet 20 milliGRAM(s) Oral two times a day  docusate sodium 100 milliGRAM(s) Oral two times a day  senna 2 Tablet(s) Oral at bedtime  cyanocobalamin 1000 MICROGram(s) Oral daily  folic acid 1 milliGRAM(s) Oral daily  enoxaparin Injectable 40 milliGRAM(s) SubCutaneous every 24 hours  metoprolol succinate ER 25 milliGRAM(s) Oral two times a day  cefTRIAXone   IVPB 1 Gram(s) IV Intermittent every 24 hours  lactulose Syrup 10 Gram(s) Oral daily  carbidopa/levodopa CR 50/200 1 Tablet(s) Oral every 6 hours    MEDICATIONS  (PRN):  ALPRAZolam 0.25 milliGRAM(s) Oral three times a day PRN anxiety/agitation      __________________________________________________  REVIEW OF SYSTEMS:    CONSTITUTIONAL: No fever,   EYES: no acute visual disturbances  NECK: No pain or stiffness  RESPIRATORY: No cough; No shortness of breath  CARDIOVASCULAR: No chest pain, no palpitations  GASTROINTESTINAL: No pain. No nausea or vomiting; No diarrhea   NEUROLOGICAL: No headache or numbness, no tremors  MUSCULOSKELETAL: No joint pain, no muscle pain  GENITOURINARY: no dysuria, no frequency, no hesitancy  PSYCHIATRY: no depression , no anxiety  ALL OTHER  ROS negative        Vital Signs Last 24 Hrs  T(C): 37.3 (01 Sep 2017 08:01), Max: 37.3 (01 Sep 2017 08:01)  T(F): 99.1 (01 Sep 2017 08:01), Max: 99.1 (01 Sep 2017 08:01)  HR: 80 (01 Sep 2017 08:01) (80 - 101)  BP: 124/67 (01 Sep 2017 08:01) (124/67 - 153/79)  BP(mean): --  RR: 16 (01 Sep 2017 08:01) (16 - 20)  SpO2: 97% (01 Sep 2017 08:01) (96% - 99%)    ________________________________________________  PHYSICAL EXAM:  GENERAL: NAD  HEENT: Normocephalic;  conjunctivae and sclerae clear; moist mucous membranes;   NECK : supple  CHEST/LUNG: Clear to auscultation bilaterally with good air entry   HEART: S1 S2  regular; no murmurs, gallops or rubs  ABDOMEN: Soft, Nontender, Nondistended; Bowel sounds present  EXTREMITIES: no cyanosis; no edema; no calf tenderness  NERVOUS SYSTEM:  Awake and alert; Oriented  to place, person and time ; no new deficits    _________________________________________________  LABS:                        13.9   6.6   )-----------( 178      ( 01 Sep 2017 08:42 )             42.4     09-01    143  |  109<H>  |  22<H>  ----------------------------<  105<H>  4.1   |  25  |  0.83    Ca    8.5      01 Sep 2017 08:42  Phos  2.5     09-  Mg     2.3     -    TPro  6.9  /  Alb  3.1<L>  /  TBili  0.8  /  DBili  x   /  AST  20  /  ALT  12  /  AlkPhos  74  09-      Urinalysis Basic - ( 31 Aug 2017 00:25 )    Color: Yellow / Appearance: Clear / S.015 / pH: x  Gluc: x / Ketone: Trace  / Bili: Negative / Urobili: Negative   Blood: x / Protein: 15 / Nitrite: Negative   Leuk Esterase: Moderate / RBC: 2-5 /HPF / WBC 26-50 /HPF   Sq Epi: x / Non Sq Epi: Few / Bacteria: Few /HPF      Consultant(s) Notes Reviewed: YES    Care Discussed with Consultants : YES    Plan of care was discussed with patient and /or primary care giver; all questions and concerns were addressed and care was aligned with patient's wishes.

## 2017-09-01 NOTE — CONSULT NOTE ADULT - SUBJECTIVE AND OBJECTIVE BOX
HPI:  Pt is an 86 year old male with PMH significant for HLD, Parkinson's, anxiety, BPH, constipation and PSHx of cholecystectomy presents to ED c/o abd pain  for 2 days. Pt describes it in the lower abdominal region as a cramping pain, and states he has constipation with his last BM 2 days ago. According to wife, she gave him milk of magnesia with no improvement of symptoms. Today, pt states he became very weak. He usually walks w/ cane but was unable to stand up on own today. Denies any fever, headache, nausea, vomiting, diarrhea, shortness of breath.          PAST MEDICAL & SURGICAL HISTORY:  Abdominal pain  Calcified granuloma of lung  Asbestos-induced pleural plaque  Constipation  BPH (benign prostatic hyperplasia)  Coronary artery disease due to calcified coronary lesion  HLD (hyperlipidemia)  Fall, subsequent encounter  Essential hypertension  Parkinsons disease  History of cholecystectomy      penicillin (Unknown)      Meds:  sodium chloride 0.9%. 1000 milliLiter(s) IV Continuous <Continuous>  DULoxetine 30 milliGRAM(s) Oral daily  atorvastatin 20 milliGRAM(s) Oral at bedtime  ALPRAZolam 0.25 milliGRAM(s) Oral three times a day PRN  famotidine    Tablet 20 milliGRAM(s) Oral two times a day  docusate sodium 100 milliGRAM(s) Oral two times a day  senna 2 Tablet(s) Oral at bedtime  cyanocobalamin 1000 MICROGram(s) Oral daily  folic acid 1 milliGRAM(s) Oral daily  enoxaparin Injectable 40 milliGRAM(s) SubCutaneous every 24 hours  metoprolol succinate ER 25 milliGRAM(s) Oral two times a day  cefTRIAXone   IVPB 1 Gram(s) IV Intermittent every 24 hours  lactulose Syrup 10 Gram(s) Oral daily  carbidopa/levodopa CR 50/200 1 Tablet(s) Oral every 6 hours      SOCIAL HISTORY:  Smoker:  YES / NO          ETOH use:  YES / NO                    FAMILY HISTORY:  No pertinent family history in first degree relatives      VITALS:  Vital Signs Last 24 Hrs  T(C): 37.3 (01 Sep 2017 16:09), Max: 37.3 (01 Sep 2017 08:01)  T(F): 99.2 (01 Sep 2017 16:09), Max: 99.2 (01 Sep 2017 16:09)  HR: 90 (01 Sep 2017 16:09) (80 - 90)  BP: 108/65 (01 Sep 2017 16:09) (108/65 - 153/79)  BP(mean): 78 (01 Sep 2017 15:50) (78 - 78)  RR: 18 (01 Sep 2017 16:09) (16 - 18)  SpO2: 97% (01 Sep 2017 16:09) (96% - 98%)    LABS/DIAGNOSTIC TESTS:                          13.9   6.6   )-----------( 178      ( 01 Sep 2017 08:42 )             42.4     WBC Count: 6.6 K/uL ( @ 08:42)  WBC Count: 6.4 K/uL ( @ 00:25)          143  |  109<H>  |  22<H>  ----------------------------<  105<H>  4.1   |  25  |  0.83    Ca    8.5      01 Sep 2017 08:42  Phos  2.5       Mg     2.3         TPro  6.9  /  Alb  3.1<L>  /  TBili  0.8  /  DBili  x   /  AST  20  /  ALT  12  /  AlkPhos  74        Urinalysis Basic - ( 31 Aug 2017 00:25 )    Color: Yellow / Appearance: Clear / S.015 / pH: x  Gluc: x / Ketone: Trace  / Bili: Negative / Urobili: Negative   Blood: x / Protein: 15 / Nitrite: Negative   Leuk Esterase: Moderate / RBC: 2-5 /HPF / WBC 26-50 /HPF   Sq Epi: x / Non Sq Epi: Few / Bacteria: Few /HPF        LIVER FUNCTIONS - ( 01 Sep 2017 08:42 )  Alb: 3.1 g/dL / Pro: 6.9 g/dL / ALK PHOS: 74 U/L / ALT: 12 U/L DA / AST: 20 U/L / GGT: x                 LACTATE:    ABG -     CULTURES:       RADIOLOGY:< from: CT Abdomen and Pelvis w/ Oral Cont and w/ IV Cont (17 @ 02:15) >  EXAM:  CT ABDOMEN AND PELVIS OC IC                            PROCEDURE DATE:  2017          INTERPRETATION:  CLINICAL HISTORY: Right lower quadrant pain. Evaluate   for appendicitis.    TECHNIQUE:  CT scan of the abdomen and pelvis with IV contrast.  Transaxial images were acquired from the domes of the diaphragm to the   symphysis pubis with intravenous contrast. Oral contrast was   administered. Coronal and sagittal images were also provided from the   transaxial source data. 90mLs of Omnipaque 350 was administered   intravenously without complication and 10 mLs was discarded.    COMPARISON: CT abdomen and pelvis from 2016.    FINDINGS:   Stable right middle lobe calcified granuloma. The heart is not enlarged.    The large andsmall bowel are normal in caliber without obstruction.   There is no free intraperitoneal air or abdominal abscess.  The appendix   is normal. Scattered colonic diverticulosis. There is no abnormal bowel   wall thickening or inflammatory change. Moderate amount of retained fecal   material seen throughout the colon and rectum.    Status post cholecystectomy. Stable mild intrahepatic biliary duct   dilatation which likely reflects the postcholecystectomy state. The   liver, spleen, pancreas and adrenal glands are normal.  The kidneys   enhance symmetrically without hydronephrosis.     The abdominal aorta is normal in caliber. There is no retroperitoneal,   pelvic or inguinal adenopathy. There is no ascites. A peripherally   calcified soft tissue density ovoid mass in the left upper quadrant   adjacent to the pancreas is stable measuring 3.1 cm on series 2 image 32.    The urinary bladder is unremarkable. The prostate gland is not enlarged.    The visualized osseous structures demonstrate no acute abnormality.    IMPRESSION:   Normal appendix. Diverticulosis without diverticulitis.        < end of copied text >          ROS  [  ] UNABLE TO ELICIT HPI:  Pt is an 86 year old male with PMH significant for HLD, Parkinson's, anxiety, BPH, constipation and PSHx of cholecystectomy presents to ED c/o abd pain  for 2 days. Pt describes it in the lower abdominal region as a cramping pain, and states he has constipation with his last BM 2 days ago. According to wife, she gave him milk of magnesia with no improvement of symptoms. Today, pt states he became very weak. He usually walks w/ cane but was unable to stand up on own today. Denies any fever, headache, nausea, vomiting, diarrhea, shortness of breath. Pt states that he had frequency of urination 1 week ago but not now, he denies dysuria or any abdominal pain at this time. Pt is a poor historian and appears mildly confused.          PAST MEDICAL & SURGICAL HISTORY:  Abdominal pain  Calcified granuloma of lung  Asbestos-induced pleural plaque  Constipation  BPH (benign prostatic hyperplasia)  Coronary artery disease due to calcified coronary lesion  HLD (hyperlipidemia)  Fall, subsequent encounter  Essential hypertension  Parkinsons disease  History of cholecystectomy      penicillin (Unknown)      Meds:  sodium chloride 0.9%. 1000 milliLiter(s) IV Continuous <Continuous>  DULoxetine 30 milliGRAM(s) Oral daily  atorvastatin 20 milliGRAM(s) Oral at bedtime  ALPRAZolam 0.25 milliGRAM(s) Oral three times a day PRN  famotidine    Tablet 20 milliGRAM(s) Oral two times a day  docusate sodium 100 milliGRAM(s) Oral two times a day  senna 2 Tablet(s) Oral at bedtime  cyanocobalamin 1000 MICROGram(s) Oral daily  folic acid 1 milliGRAM(s) Oral daily  enoxaparin Injectable 40 milliGRAM(s) SubCutaneous every 24 hours  metoprolol succinate ER 25 milliGRAM(s) Oral two times a day  cefTRIAXone   IVPB 1 Gram(s) IV Intermittent every 24 hours  lactulose Syrup 10 Gram(s) Oral daily  carbidopa/levodopa CR 50/200 1 Tablet(s) Oral every 6 hours      SOCIAL HISTORY:  Smoker:   NO          ETOH use:  YES, socially                FAMILY HISTORY:  No pertinent family history in first degree relatives      VITALS:  Vital Signs Last 24 Hrs  T(C): 37.3 (01 Sep 2017 16:09), Max: 37.3 (01 Sep 2017 08:01)  T(F): 99.2 (01 Sep 2017 16:09), Max: 99.2 (01 Sep 2017 16:09)  HR: 90 (01 Sep 2017 16:09) (80 - 90)  BP: 108/65 (01 Sep 2017 16:09) (108/65 - 153/79)  BP(mean): 78 (01 Sep 2017 15:50) (78 - 78)  RR: 18 (01 Sep 2017 16:09) (16 - 18)  SpO2: 97% (01 Sep 2017 16:09) (96% - 98%)    LABS/DIAGNOSTIC TESTS:                          13.9   6.6   )-----------( 178      ( 01 Sep 2017 08:42 )             42.4     WBC Count: 6.6 K/uL ( @ 08:42)  WBC Count: 6.4 K/uL ( @ 00:25)      09    143  |  109<H>  |  22<H>  ----------------------------<  105<H>  4.1   |  25  |  0.83    Ca    8.5      01 Sep 2017 08:42  Phos  2.5       Mg     2.3         TPro  6.9  /  Alb  3.1<L>  /  TBili  0.8  /  DBili  x   /  AST  20  /  ALT  12  /  AlkPhos  74        Urinalysis Basic - ( 31 Aug 2017 00:25 )    Color: Yellow / Appearance: Clear / S.015 / pH: x  Gluc: x / Ketone: Trace  / Bili: Negative / Urobili: Negative   Blood: x / Protein: 15 / Nitrite: Negative   Leuk Esterase: Moderate / RBC: 2-5 /HPF / WBC 26-50 /HPF   Sq Epi: x / Non Sq Epi: Few / Bacteria: Few /HPF        LIVER FUNCTIONS - ( 01 Sep 2017 08:42 )  Alb: 3.1 g/dL / Pro: 6.9 g/dL / ALK PHOS: 74 U/L / ALT: 12 U/L DA / AST: 20 U/L / GGT: x                 LACTATE:    ABG -     CULTURES:       RADIOLOGY:< from: CT Abdomen and Pelvis w/ Oral Cont and w/ IV Cont (17 @ 02:15) >  EXAM:  CT ABDOMEN AND PELVIS OC IC                            PROCEDURE DATE:  2017          INTERPRETATION:  CLINICAL HISTORY: Right lower quadrant pain. Evaluate   for appendicitis.    TECHNIQUE:  CT scan of the abdomen and pelvis with IV contrast.  Transaxial images were acquired from the domes of the diaphragm to the   symphysis pubis with intravenous contrast. Oral contrast was   administered. Coronal and sagittal images were also provided from the   transaxial source data. 90mLs of Omnipaque 350 was administered   intravenously without complication and 10 mLs was discarded.    COMPARISON: CT abdomen and pelvis from 2016.    FINDINGS:   Stable right middle lobe calcified granuloma. The heart is not enlarged.    The large andsmall bowel are normal in caliber without obstruction.   There is no free intraperitoneal air or abdominal abscess.  The appendix   is normal. Scattered colonic diverticulosis. There is no abnormal bowel   wall thickening or inflammatory change. Moderate amount of retained fecal   material seen throughout the colon and rectum.    Status post cholecystectomy. Stable mild intrahepatic biliary duct   dilatation which likely reflects the postcholecystectomy state. The   liver, spleen, pancreas and adrenal glands are normal.  The kidneys   enhance symmetrically without hydronephrosis.     The abdominal aorta is normal in caliber. There is no retroperitoneal,   pelvic or inguinal adenopathy. There is no ascites. A peripherally   calcified soft tissue density ovoid mass in the left upper quadrant   adjacent to the pancreas is stable measuring 3.1 cm on series 2 image 32.    The urinary bladder is unremarkable. The prostate gland is not enlarged.    The visualized osseous structures demonstrate no acute abnormality.    IMPRESSION:   Normal appendix. Diverticulosis without diverticulitis.        < end of copied text >          ROS  [  ] UNABLE TO ELICIT

## 2017-09-01 NOTE — PROGRESS NOTE ADULT - PROBLEM SELECTOR PLAN 1
Patient currently asymptomatic  UA with presence of WBCs, nitrites, LE  - Afebrile, no WBC ct, no urinary complaints  Plan to discontinue ABx and monitor; possible discharge tomorrow  ***F/u urine cultures and sensitivity

## 2017-09-01 NOTE — PROGRESS NOTE ADULT - PROBLEM SELECTOR PLAN 3
May be 2/2 to UTI or exacerbated Parkinson's disease  - C/w Parkinson's medications home dose at appropriate interval  ***F/u PT evaluation

## 2017-09-01 NOTE — CONSULT NOTE ADULT - GASTROINTESTINAL DETAILS
no guarding/no masses palpable/bowel sounds normal/no rigidity/soft/nontender/no rebound tenderness/no distention

## 2017-09-02 ENCOUNTER — TRANSCRIPTION ENCOUNTER (OUTPATIENT)
Age: 82
End: 2017-09-02

## 2017-09-02 LAB
ANION GAP SERPL CALC-SCNC: 8 MMOL/L — SIGNIFICANT CHANGE UP (ref 5–17)
BUN SERPL-MCNC: 25 MG/DL — HIGH (ref 7–18)
CALCIUM SERPL-MCNC: 8.7 MG/DL — SIGNIFICANT CHANGE UP (ref 8.4–10.5)
CHLORIDE SERPL-SCNC: 109 MMOL/L — HIGH (ref 96–108)
CO2 SERPL-SCNC: 27 MMOL/L — SIGNIFICANT CHANGE UP (ref 22–31)
CREAT SERPL-MCNC: 0.94 MG/DL — SIGNIFICANT CHANGE UP (ref 0.5–1.3)
CULTURE RESULTS: NO GROWTH — SIGNIFICANT CHANGE UP
GLUCOSE SERPL-MCNC: 93 MG/DL — SIGNIFICANT CHANGE UP (ref 70–99)
HCT VFR BLD CALC: 42.3 % — SIGNIFICANT CHANGE UP (ref 39–50)
HGB BLD-MCNC: 14.4 G/DL — SIGNIFICANT CHANGE UP (ref 13–17)
MCHC RBC-ENTMCNC: 34.1 GM/DL — SIGNIFICANT CHANGE UP (ref 32–36)
MCHC RBC-ENTMCNC: 34.5 PG — HIGH (ref 27–34)
MCV RBC AUTO: 101.2 FL — HIGH (ref 80–100)
PLATELET # BLD AUTO: 153 K/UL — SIGNIFICANT CHANGE UP (ref 150–400)
POTASSIUM SERPL-MCNC: 4.5 MMOL/L — SIGNIFICANT CHANGE UP (ref 3.5–5.3)
POTASSIUM SERPL-SCNC: 4.5 MMOL/L — SIGNIFICANT CHANGE UP (ref 3.5–5.3)
RBC # BLD: 4.18 M/UL — LOW (ref 4.2–5.8)
RBC # FLD: 11.5 % — SIGNIFICANT CHANGE UP (ref 10.3–14.5)
SODIUM SERPL-SCNC: 144 MMOL/L — SIGNIFICANT CHANGE UP (ref 135–145)
SPECIMEN SOURCE: SIGNIFICANT CHANGE UP
WBC # BLD: 6 K/UL — SIGNIFICANT CHANGE UP (ref 3.8–10.5)
WBC # FLD AUTO: 6 K/UL — SIGNIFICANT CHANGE UP (ref 3.8–10.5)

## 2017-09-02 RX ORDER — CEFTRIAXONE 500 MG/1
1 INJECTION, POWDER, FOR SOLUTION INTRAMUSCULAR; INTRAVENOUS EVERY 24 HOURS
Qty: 0 | Refills: 0 | Status: DISCONTINUED | OUTPATIENT
Start: 2017-09-02 | End: 2017-09-04

## 2017-09-02 RX ADMIN — Medication 100 MILLIGRAM(S): at 17:33

## 2017-09-02 RX ADMIN — Medication 25 MILLIGRAM(S): at 17:33

## 2017-09-02 RX ADMIN — ENOXAPARIN SODIUM 40 MILLIGRAM(S): 100 INJECTION SUBCUTANEOUS at 06:17

## 2017-09-02 RX ADMIN — CARBIDOPA AND LEVODOPA 1 TABLET(S): 25; 100 TABLET ORAL at 06:17

## 2017-09-02 RX ADMIN — PREGABALIN 1000 MICROGRAM(S): 225 CAPSULE ORAL at 12:00

## 2017-09-02 RX ADMIN — FAMOTIDINE 20 MILLIGRAM(S): 10 INJECTION INTRAVENOUS at 06:17

## 2017-09-02 RX ADMIN — ATORVASTATIN CALCIUM 20 MILLIGRAM(S): 80 TABLET, FILM COATED ORAL at 21:19

## 2017-09-02 RX ADMIN — LACTULOSE 10 GRAM(S): 10 SOLUTION ORAL at 12:01

## 2017-09-02 RX ADMIN — Medication 25 MILLIGRAM(S): at 06:17

## 2017-09-02 RX ADMIN — Medication 1 MILLIGRAM(S): at 12:00

## 2017-09-02 RX ADMIN — FAMOTIDINE 20 MILLIGRAM(S): 10 INJECTION INTRAVENOUS at 17:33

## 2017-09-02 RX ADMIN — CARBIDOPA AND LEVODOPA 1 TABLET(S): 25; 100 TABLET ORAL at 12:00

## 2017-09-02 RX ADMIN — DULOXETINE HYDROCHLORIDE 30 MILLIGRAM(S): 30 CAPSULE, DELAYED RELEASE ORAL at 12:00

## 2017-09-02 RX ADMIN — SENNA PLUS 2 TABLET(S): 8.6 TABLET ORAL at 21:19

## 2017-09-02 RX ADMIN — CARBIDOPA AND LEVODOPA 1 TABLET(S): 25; 100 TABLET ORAL at 17:33

## 2017-09-02 RX ADMIN — CEFTRIAXONE 100 GRAM(S): 500 INJECTION, POWDER, FOR SOLUTION INTRAMUSCULAR; INTRAVENOUS at 14:14

## 2017-09-02 RX ADMIN — Medication 100 MILLIGRAM(S): at 06:16

## 2017-09-02 RX ADMIN — Medication 0.25 MILLIGRAM(S): at 15:30

## 2017-09-02 NOTE — DISCHARGE NOTE ADULT - PLAN OF CARE
Take stool softeners Urinalaysis was positive but no organism identified on culture - abdominal pain likely secondary to constipation, improved during hospital stay after bowel movement. Patient advised to stay hydrated and take stool softeners as needed and to follow up with his primary care physician within a week.

## 2017-09-02 NOTE — DISCHARGE NOTE ADULT - HOSPITAL COURSE
76 M ambulatory w/ cane with PMH of HLD, Parkinson's, anxiety, BPH, constipation and PSHx of cholecystectomy presents to ED c/o lower cramping abdominal pain for 2 days associated w/ weakness and constipation (last BM 2 days prior to admission) not improved w/ MgOH - work up showed  UA positive for UTI, negative CT abdomen/pelvis - admitted for UTI    Patient seen and examined at bedside with no new complaints, no abdominal pain, BM reported, urine culture negative, discontinued ABx, PT recommends no rehab and stable for home, otherwise asymptomatic.    Problem/Plan - 1:  ·  Problem: Urinary tract infection without hematuria, site unspecified.  Plan: Patient currently asymptomatic  UA with presence of WBCs, nitrites, LE  - Afebrile, no WBC ct, no urinary complaints  - Continue ABx and monitor  ***F/u urine cultures and sensitivity.     Problem/Plan - 2:  ·  Problem: Constipation, unspecified constipation type.  Plan: BM reported  - Continue with senna, colace, lactulose, and tap water enema.     Problem/Plan - 3:  ·  Problem: Generalized weakness.  Plan: May be 2/2 to UTI or exacerbated Parkinson's disease  - C/w Parkinson's medications home dose at appropriate interval  - PT recommends no rehab and discharge to home.     Problem/Plan - 4:  ·  Problem: Coronary artery disease due to calcified coronary lesion.  Plan: - C/w home medications.     Problem/Plan - 5:  ·  Problem: BPH (benign prostatic hyperplasia).  Plan: - C/w home medications.     Problem/Plan - 6:  Problem: HLD (hyperlipidemia). Plan: - C/w home medications.    Problem/Plan - 7:  ·  Problem: Parkinsons disease.  Plan: - C/w home medications.     Problem/Plan - 8:  ·  Problem: Need for prophylactic measure.  Plan: IMPROVE score 2, Lovenox.     Patient seen and examined at bedside with no new complaints, no abdominal pain, BM reported, urine culture still pending, will continue w/ ABx as per ID, PT recommends no rehab and stable for home, BP elevated at a.m w/ tachycardia - otherwise asymptomatic 76 M ambulatory w/ cane with PMH of HLD, Parkinson's, anxiety, BPH, constipation and PSHx of cholecystectomy presents to ED c/o lower cramping abdominal pain for 2 days associated w/ weakness and constipation (last BM 2 days prior to admission) not improved w/ MgOH - work up showed  UA positive for UTI, negative CT abdomen/pelvis - admitted for UTI    Problem/Plan - 1:  ·  Problem: Urinary tract infection without hematuria, site unspecified.  Plan: Patient currently asymptomatic  UA with presence of WBCs, nitrites, LE  - Afebrile, no WBC ct, no urinary complaints  - Continued ABx until urine cultures returned negative.     Problem/Plan - 2:  ·  Problem: Constipation, unspecified constipation type.  Plan: BM reported  - Continue with senna, colace, lactulose, and tap water enema.     Problem/Plan - 3:  ·  Problem: Generalized weakness.  Plan: May be 2/2 to UTI or exacerbated Parkinson's disease  - C/w Parkinson's medications home dose at appropriate interval  - PT recommends no rehab and discharge to home.     Problem/Plan - 4:  ·  Problem: Coronary artery disease due to calcified coronary lesion.  Plan: - C/w home medications.     Problem/Plan - 5:  ·  Problem: BPH (benign prostatic hyperplasia).  Plan: - C/w home medications.     Problem/Plan - 6:  Problem: HLD (hyperlipidemia). Plan: - C/w home medications.    Problem/Plan - 7:  ·  Problem: Parkinsons disease.  Plan: - C/w home medications.     Problem/Plan - 8:  ·  Problem: Need for prophylactic measure.  Plan: IMPROVE score 2, Lovenox.     Patient seen and examined at bedside with no new complaints, no abdominal pain, bowel movement reported, urine culture negative, no need for antibiotic therapy as per infectious disease specialist, physical therapy recommends no rehab and stable for discharge to home.  The medical plan and results were discussed with the patient throughout the hospital course. Patient is medically clear for discharge and is advised to follow up with his primary care physician within a week for continued medical care.

## 2017-09-02 NOTE — DISCHARGE NOTE ADULT - CARE PLAN
Principal Discharge DX:	Abdominal pain  Goal:	Take stool softeners  Instructions for follow-up, activity and diet:	Urinalaysis was positive but no organism identified on culture - abdominal pain likely secondary to constipation, improved during hospital stay after bowel movement. Patient advised to stay hydrated and take stool softeners as needed and to follow up with his primary care physician within a week.

## 2017-09-02 NOTE — PROGRESS NOTE ADULT - SUBJECTIVE AND OBJECTIVE BOX
PGY 1 Note discussed with supervising resident and primary attending    Patient is a 86y old  Male who presents with a chief complaint of abdominal pain for 2 days (31 Aug 2017 04:40)      INTERVAL HPI/OVERNIGHT EVENTS: Patient seen and examined at bedside with no new complaints, no abdominal pain, BM reported, urine culture still pending, will continue w/ ABx as per ID, PT recommends no rehab and stable for home, BP elevated at a.m w/ tachycardia - otherwise asymptomatic    MEDICATIONS  (STANDING):  sodium chloride 0.9%. 1000 milliLiter(s) (60 mL/Hr) IV Continuous <Continuous>  DULoxetine 30 milliGRAM(s) Oral daily  atorvastatin 20 milliGRAM(s) Oral at bedtime  famotidine    Tablet 20 milliGRAM(s) Oral two times a day  docusate sodium 100 milliGRAM(s) Oral two times a day  senna 2 Tablet(s) Oral at bedtime  cyanocobalamin 1000 MICROGram(s) Oral daily  folic acid 1 milliGRAM(s) Oral daily  enoxaparin Injectable 40 milliGRAM(s) SubCutaneous every 24 hours  metoprolol succinate ER 25 milliGRAM(s) Oral two times a day  lactulose Syrup 10 Gram(s) Oral daily  carbidopa/levodopa CR 50/200 1 Tablet(s) Oral every 6 hours  cefTRIAXone   IVPB 1 Gram(s) IV Intermittent every 24 hours    MEDICATIONS  (PRN):  ALPRAZolam 0.25 milliGRAM(s) Oral three times a day PRN anxiety/agitation      __________________________________________________  REVIEW OF SYSTEMS:    CONSTITUTIONAL: No fever,   EYES: No acute visual disturbances  NECK: No pain or stiffness  RESPIRATORY: No cough; No shortness of breath  CARDIOVASCULAR: No chest pain, no palpitations  GASTROINTESTINAL: No pain. No nausea or vomiting; No diarrhea   NEUROLOGICAL: No headache or numbness, no tremors  MUSCULOSKELETAL: No joint pain, no muscle pain  GENITOURINARY: No dysuria, no frequency, no hesitancy  PSYCHIATRY: No depression , no anxiety  ALL OTHER  ROS negative        Vital Signs Last 24 Hrs  T(C): 36.8 (02 Sep 2017 08:34), Max: 37.3 (01 Sep 2017 16:09)  T(F): 98.3 (02 Sep 2017 08:34), Max: 99.2 (01 Sep 2017 16:09)  HR: 103 (02 Sep 2017 08:34) (72 - 103)  BP: 144/68 (02 Sep 2017 08:34) (94/55 - 144/68)  BP(mean): 78 (01 Sep 2017 15:50) (78 - 78)  RR: 18 (02 Sep 2017 08:34) (16 - 19)  SpO2: 100% (02 Sep 2017 08:34) (95% - 100%)    ________________________________________________  PHYSICAL EXAM:  GENERAL: NAD, parkinsonian gait, short steps  HEENT: Normocephalic;  conjunctivae and sclerae clear; moist mucous membranes;   NECK : supple  CHEST/LUNG: Clear to auscultation bilaterally with good air entry   HEART: S1 S2  regular; no murmurs, gallops or rubs  ABDOMEN: Soft, Nontender, Nondistended; Bowel sounds present  EXTREMITIES: No cyanosis; no edema; no calf tenderness  NERVOUS SYSTEM:  Awake and alert; Oriented  to place, person and time ; no new deficits, mild cogwheel rigidity    _________________________________________________  LABS:                        14.4   6.0   )-----------( 153      ( 02 Sep 2017 06:42 )             42.3     09-02    144  |  109<H>  |  25<H>  ----------------------------<  93  4.5   |  27  |  0.94    Ca    8.7      02 Sep 2017 06:42  Phos  2.5     09-01  Mg     2.3     09-01    TPro  6.9  /  Alb  3.1<L>  /  TBili  0.8  /  DBili  x   /  AST  20  /  ALT  12  /  AlkPhos  74  09-01        CAPILLARY BLOOD GLUCOSE      RADIOLOGY & ADDITIONAL TESTS:    Imaging Personally Reviewed:  YES    Consultant(s) Notes Reviewed:   YES    Care Discussed with Consultants : YES    Plan of care was discussed with patient and /or primary care giver; all questions and concerns were addressed and care was aligned with patient's wishes.

## 2017-09-02 NOTE — PROGRESS NOTE ADULT - PROBLEM SELECTOR PLAN 3
May be 2/2 to UTI or exacerbated Parkinson's disease  - C/w Parkinson's medications home dose at appropriate interval  - PT recommends no rehab and discharge to home

## 2017-09-02 NOTE — DISCHARGE NOTE ADULT - MEDICATION SUMMARY - MEDICATIONS TO TAKE
I will START or STAY ON the medications listed below when I get home from the hospital:    DULoxetine 30 mg oral delayed release capsule  --  by mouth once a day (at bedtime)  -- Indication: For Anxiety disorder due to general medical condition    Lipitor 20 mg oral tablet  -- 1 tab(s) by mouth once a day (at bedtime)  -- Indication: For HLD (hyperlipidemia)    Sinemet CR 50 mg-200 mg oral tablet, extended release  -- 1 tab(s) by mouth every 6 hours  -- Indication: For Parkinsons disease    ALPRAZolam 0.25 mg oral tablet  -- 1 tab(s) by mouth 3 times a day, As Needed for anxiety and agitation  -- Indication: For Anxiety disorder due to general medical condition    metoprolol succinate 25 mg oral tablet, extended release  -- 1 tab(s) by mouth 2 times a day  -- Indication: For Coronary artery disease due to calcified coronary lesion    famotidine 20 mg oral tablet  -- 1 tab(s) by mouth 2 times a day  -- Indication: For Abdominal pain    lactulose 10 g/15 mL oral syrup  -- 15 milliliter(s) by mouth once a day, As Needed  -- Indication: For Constipation, unspecified constipation type    docusate sodium 100 mg oral capsule  -- 1 cap(s) by mouth 2 times a day  -- Indication: For Constipation, unspecified constipation type    senna oral tablet  -- 2 tab(s) by mouth once a day (at bedtime)  -- Indication: For Constipation, unspecified constipation type    cyanocobalamin 1000 mcg oral tablet  -- 1 tab(s) by mouth once a day  -- Indication: For Need for prophylactic measure    folic acid 1 mg oral tablet  -- 1 tab(s) by mouth once a day  -- Indication: For Need for prophylactic measure

## 2017-09-02 NOTE — PROGRESS NOTE ADULT - PROBLEM SELECTOR PLAN 1
Patient currently asymptomatic  UA with presence of WBCs, nitrites, LE  - Afebrile, no WBC ct, no urinary complaints  - Continue ABx and monitor  ***F/u urine cultures and sensitivity

## 2017-09-02 NOTE — PROGRESS NOTE ADULT - ATTENDING COMMENTS
Patient is seen and examined. Case fully discussed with the medical team. Above note is appreciated. Spoke to wife via . Will follow up and monitor for bowel movements and fecal impaction.
Patient is seen and examined. Case fully discussed with the medical team. Above note is appreciated. Doubt UTI, will however follow up urine culture and  follow up with ID. Constipation/fecal impaction. Continue enemas and Oral meds for constipation. Will follow up abdominal xray.

## 2017-09-03 DIAGNOSIS — F06.4 ANXIETY DISORDER DUE TO KNOWN PHYSIOLOGICAL CONDITION: ICD-10-CM

## 2017-09-03 RX ADMIN — Medication 25 MILLIGRAM(S): at 05:21

## 2017-09-03 RX ADMIN — Medication 1 MILLIGRAM(S): at 11:21

## 2017-09-03 RX ADMIN — CARBIDOPA AND LEVODOPA 1 TABLET(S): 25; 100 TABLET ORAL at 05:21

## 2017-09-03 RX ADMIN — ENOXAPARIN SODIUM 40 MILLIGRAM(S): 100 INJECTION SUBCUTANEOUS at 05:21

## 2017-09-03 RX ADMIN — SENNA PLUS 2 TABLET(S): 8.6 TABLET ORAL at 21:38

## 2017-09-03 RX ADMIN — FAMOTIDINE 20 MILLIGRAM(S): 10 INJECTION INTRAVENOUS at 18:02

## 2017-09-03 RX ADMIN — CEFTRIAXONE 100 GRAM(S): 500 INJECTION, POWDER, FOR SOLUTION INTRAMUSCULAR; INTRAVENOUS at 13:07

## 2017-09-03 RX ADMIN — PREGABALIN 1000 MICROGRAM(S): 225 CAPSULE ORAL at 11:21

## 2017-09-03 RX ADMIN — CARBIDOPA AND LEVODOPA 1 TABLET(S): 25; 100 TABLET ORAL at 11:21

## 2017-09-03 RX ADMIN — CARBIDOPA AND LEVODOPA 1 TABLET(S): 25; 100 TABLET ORAL at 00:17

## 2017-09-03 RX ADMIN — Medication 100 MILLIGRAM(S): at 18:02

## 2017-09-03 RX ADMIN — CARBIDOPA AND LEVODOPA 1 TABLET(S): 25; 100 TABLET ORAL at 18:02

## 2017-09-03 RX ADMIN — SODIUM CHLORIDE 60 MILLILITER(S): 9 INJECTION INTRAMUSCULAR; INTRAVENOUS; SUBCUTANEOUS at 05:22

## 2017-09-03 RX ADMIN — ATORVASTATIN CALCIUM 20 MILLIGRAM(S): 80 TABLET, FILM COATED ORAL at 21:38

## 2017-09-03 RX ADMIN — Medication 100 MILLIGRAM(S): at 05:21

## 2017-09-03 RX ADMIN — DULOXETINE HYDROCHLORIDE 30 MILLIGRAM(S): 30 CAPSULE, DELAYED RELEASE ORAL at 11:21

## 2017-09-03 RX ADMIN — FAMOTIDINE 20 MILLIGRAM(S): 10 INJECTION INTRAVENOUS at 05:21

## 2017-09-03 RX ADMIN — CARBIDOPA AND LEVODOPA 1 TABLET(S): 25; 100 TABLET ORAL at 23:22

## 2017-09-03 NOTE — PROGRESS NOTE ADULT - ASSESSMENT
76 M ambulatory w/ cane with PMH of HLD, Parkinson's, anxiety, BPH, constipation and PSHx of cholecystectomy presents to ED c/o lower cramping abdominal pain for 2 days associated w/ weakness and constipation (last BM 2 days ago)not improved w/ MgOH work up showed  UA positive for UTI, negative CT abdomen/pelvis - admitted for UTI
76 M ambulatory w/ cane with PMH of HLD, Parkinson's, anxiety, BPH, constipation and PSHx of cholecystectomy presents to ED c/o lower cramping abdominal pain for 2 days associated w/ weakness and constipation (last BM 2 days ago)not improved w/ MgOH work up showed  UA positive for UTI, negative CT abdomen/pelvis - admitted for UTI
76 M ambulatory w/ cane with PMH of HLD, Parkinson's, anxiety, BPH, constipation and PSHx of cholecystectomy presents to ED c/o lower cramping abdominal pain for 2 days associated w/ weakness and constipation (last BM 2 days ago)not improved w/ MgOH work up showed  UA positive for UTI, negative CT abdomen/pelvis - admitted for UTI. Feels well today with no specific complaints. Seen by psychiatrist today, Dr. Garcia. and his note is appreciated. Will continue clinically.
Pt is an 86 year old male with PMH significant for HLD, Parkinson's, anxiety, BPH, constipation and PSHx of cholecystectomy presents to ED c/o abd pain  for 2 days. Pt describes it in the lower abdominal region as a cramping pain, and states he has constipation with his last BM 2 days ago. According to wife, she gave him milk of magnesia with no improvement of symptoms. Today, pt states he became very weak. He usually walks w/ cane but was unable to stand up on own today. Denies any fever, headache, nausea, vomiting, diarrhea, shortness of breath.    In ED, received 1 L NS bolus and s/p 1 dose of rocephin

## 2017-09-03 NOTE — PROGRESS NOTE ADULT - SUBJECTIVE AND OBJECTIVE BOX
CHIEF COMPLAINT:Patient is a 86y old  Male who presents with a chief complaint of abdominal pain for 2 days (02 Sep 2017 15:16)    	  REVIEW OF SYSTEMS:  CONSTITUTIONAL: No fever, weight loss, or fatigue  EYES: No eye pain, visual disturbances, or discharge  ENMT:  No difficulty hearing, tinnitus, vertigo; No sinus or throat pain  NECK: No pain or stiffness  RESPIRATORY: No cough, wheezing, chills or hemoptysis; No Shortness of Breath  CARDIOVASCULAR: No chest pain, palpitations, passing out, dizziness, or leg swelling  GASTROINTESTINAL: No abdominal or epigastric pain. No nausea, vomiting, or hematemesis; No diarrhea or constipation. No melena or hematochezia.  GENITOURINARY: No dysuria, frequency, hematuria, or incontinence  NEUROLOGICAL: No headaches, memory loss, loss of strength, numbness, or tremors  SKIN: No itching, burning, rashes, or lesions   LYMPH Nodes: No enlarged glands  ENDOCRINE: No heat or cold intolerance; No hair loss  MUSCULOSKELETAL: No joint pain or swelling; No muscle, back, or extremity pain  PSYCHIATRIC: No depression, anxiety, mood swings, or difficulty sleeping  HEME/LYMPH: No easy bruising, or bleeding gums  ALLERY AND IMMUNOLOGIC: No hives or eczema	    [ ] All others negative	  [ ] Unable to obtain    PHYSICAL EXAM:  T(C): 37.2 (09-03-17 @ 08:17), Max: 37.2 (09-03-17 @ 08:17)  HR: 78 (09-03-17 @ 08:17) (76 - 100)  BP: 133/72 (09-03-17 @ 08:17) (114/66 - 151/71)  RR: 18 (09-03-17 @ 08:17) (18 - 18)  SpO2: 99% (09-03-17 @ 08:17) (98% - 99%)  Wt(kg): --  I&O's Summary    02 Sep 2017 07:01  -  03 Sep 2017 07:00  --------------------------------------------------------  IN: 0 mL / OUT: 200 mL / NET: -200 mL        Appearance: Normal	  HEENT:   Normal oral mucosa, PERRL, EOMI	  Lymphatic: No lymphadenopathy  Cardiovascular: Normal S1 S2, No JVD, No murmurs, No edema  Respiratory: Lungs clear to auscultation	  Psychiatry: A & O x 3, Mood & affect appropriate  Gastrointestinal:  Soft, Non-tender, + BS	  Skin: No rashes, No ecchymoses, No cyanosis	  Neurologic: Non-focal  Extremities: Normal range of motion, No clubbing, cyanosis or edema  Vascular: Peripheral pulses palpable 2+ bilaterally    MEDICATIONS  (STANDING):  sodium chloride 0.9%. 1000 milliLiter(s) (60 mL/Hr) IV Continuous <Continuous>  DULoxetine 30 milliGRAM(s) Oral daily  atorvastatin 20 milliGRAM(s) Oral at bedtime  famotidine    Tablet 20 milliGRAM(s) Oral two times a day  docusate sodium 100 milliGRAM(s) Oral two times a day  senna 2 Tablet(s) Oral at bedtime  cyanocobalamin 1000 MICROGram(s) Oral daily  folic acid 1 milliGRAM(s) Oral daily  enoxaparin Injectable 40 milliGRAM(s) SubCutaneous every 24 hours  metoprolol succinate ER 25 milliGRAM(s) Oral two times a day  lactulose Syrup 10 Gram(s) Oral daily  carbidopa/levodopa CR 50/200 1 Tablet(s) Oral every 6 hours  cefTRIAXone   IVPB 1 Gram(s) IV Intermittent every 24 hours      TELEMETRY: 	    ECG:  	  RADIOLOGY:  OTHER: 	  	  CBC Full  -  ( 02 Sep 2017 06:42 )  WBC Count : 6.0 K/uL  Hemoglobin : 14.4 g/dL  Hematocrit : 42.3 %  Platelet Count - Automated : 153 K/uL  Mean Cell Volume : 101.2 fl  Mean Cell Hemoglobin : 34.5 pg  Mean Cell Hemoglobin Concentration : 34.1 gm/dL  Auto Neutrophil # : x  Auto Lymphocyte # : x  Auto Monocyte # : x  Auto Eosinophil # : x  Auto Basophil # : x  Auto Neutrophil % : x  Auto Lymphocyte % : x  Auto Monocyte % : x  Auto Eosinophil % : x  Auto Basophil % : x      09-02    144  |  109<H>  |  25<H>  ----------------------------<  93  4.5   |  27  |  0.94    Ca    8.7      02 Sep 2017 06:42        CARDIAC MARKERS:                                14.4   6.0   )-----------( 153      ( 02 Sep 2017 06:42 )             42.3       09-02    144  |  109<H>  |  25<H>  ----------------------------<  93  4.5   |  27  |  0.94    Ca    8.7      02 Sep 2017 06:42              proBNP:   Lipid Profile:   HgA1c:   TSH:

## 2017-09-03 NOTE — BEHAVIORAL HEALTH ASSESSMENT NOTE - SUMMARY
This is a 85 y/o  , male with PMH of above ,was admitted with abd pain.  Patient was interviewed,chart was reviewed,case was discussed with MD.  Patient stated: "I feel OK", "some time I feel anxious".  Patient is on Cymbalta 30mg po qd and Xanax 0.25mg po PRN.  Patient is a/o x2 cooperative verbal,behaviorally controlled.Speech is goal directed loogical.Denied s/h nthought.Memory and cognititon-mildly impaired.I&J-fair.

## 2017-09-04 VITALS
SYSTOLIC BLOOD PRESSURE: 146 MMHG | DIASTOLIC BLOOD PRESSURE: 65 MMHG | RESPIRATION RATE: 20 BRPM | HEART RATE: 88 BPM | OXYGEN SATURATION: 98 % | TEMPERATURE: 98 F

## 2017-09-04 LAB
ANION GAP SERPL CALC-SCNC: 8 MMOL/L — SIGNIFICANT CHANGE UP (ref 5–17)
BUN SERPL-MCNC: 16 MG/DL — SIGNIFICANT CHANGE UP (ref 7–18)
CALCIUM SERPL-MCNC: 8.4 MG/DL — SIGNIFICANT CHANGE UP (ref 8.4–10.5)
CHLORIDE SERPL-SCNC: 110 MMOL/L — HIGH (ref 96–108)
CO2 SERPL-SCNC: 24 MMOL/L — SIGNIFICANT CHANGE UP (ref 22–31)
CREAT SERPL-MCNC: 0.76 MG/DL — SIGNIFICANT CHANGE UP (ref 0.5–1.3)
GLUCOSE SERPL-MCNC: 99 MG/DL — SIGNIFICANT CHANGE UP (ref 70–99)
HCT VFR BLD CALC: 41 % — SIGNIFICANT CHANGE UP (ref 39–50)
HGB BLD-MCNC: 13.8 G/DL — SIGNIFICANT CHANGE UP (ref 13–17)
MCHC RBC-ENTMCNC: 33.6 GM/DL — SIGNIFICANT CHANGE UP (ref 32–36)
MCHC RBC-ENTMCNC: 34.2 PG — HIGH (ref 27–34)
MCV RBC AUTO: 101.7 FL — HIGH (ref 80–100)
PLATELET # BLD AUTO: 146 K/UL — LOW (ref 150–400)
POTASSIUM SERPL-MCNC: 3.7 MMOL/L — SIGNIFICANT CHANGE UP (ref 3.5–5.3)
POTASSIUM SERPL-SCNC: 3.7 MMOL/L — SIGNIFICANT CHANGE UP (ref 3.5–5.3)
RBC # BLD: 4.03 M/UL — LOW (ref 4.2–5.8)
RBC # FLD: 11.4 % — SIGNIFICANT CHANGE UP (ref 10.3–14.5)
SODIUM SERPL-SCNC: 142 MMOL/L — SIGNIFICANT CHANGE UP (ref 135–145)
WBC # BLD: 6.4 K/UL — SIGNIFICANT CHANGE UP (ref 3.8–10.5)
WBC # FLD AUTO: 6.4 K/UL — SIGNIFICANT CHANGE UP (ref 3.8–10.5)

## 2017-09-04 RX ADMIN — DULOXETINE HYDROCHLORIDE 30 MILLIGRAM(S): 30 CAPSULE, DELAYED RELEASE ORAL at 11:45

## 2017-09-04 RX ADMIN — FAMOTIDINE 20 MILLIGRAM(S): 10 INJECTION INTRAVENOUS at 06:23

## 2017-09-04 RX ADMIN — Medication 25 MILLIGRAM(S): at 06:23

## 2017-09-04 RX ADMIN — CARBIDOPA AND LEVODOPA 1 TABLET(S): 25; 100 TABLET ORAL at 06:23

## 2017-09-04 RX ADMIN — Medication 100 MILLIGRAM(S): at 06:23

## 2017-09-04 RX ADMIN — ENOXAPARIN SODIUM 40 MILLIGRAM(S): 100 INJECTION SUBCUTANEOUS at 06:24

## 2017-09-04 RX ADMIN — Medication 1 MILLIGRAM(S): at 11:45

## 2017-09-04 RX ADMIN — CARBIDOPA AND LEVODOPA 1 TABLET(S): 25; 100 TABLET ORAL at 11:45

## 2017-09-04 RX ADMIN — Medication 0.25 MILLIGRAM(S): at 10:27

## 2017-09-04 RX ADMIN — PREGABALIN 1000 MICROGRAM(S): 225 CAPSULE ORAL at 11:45

## 2017-09-06 DIAGNOSIS — F41.9 ANXIETY DISORDER, UNSPECIFIED: ICD-10-CM

## 2017-09-06 DIAGNOSIS — I25.10 ATHEROSCLEROTIC HEART DISEASE OF NATIVE CORONARY ARTERY WITHOUT ANGINA PECTORIS: ICD-10-CM

## 2017-09-06 DIAGNOSIS — I10 ESSENTIAL (PRIMARY) HYPERTENSION: ICD-10-CM

## 2017-09-06 DIAGNOSIS — E78.5 HYPERLIPIDEMIA, UNSPECIFIED: ICD-10-CM

## 2017-09-06 DIAGNOSIS — Z90.49 ACQUIRED ABSENCE OF OTHER SPECIFIED PARTS OF DIGESTIVE TRACT: ICD-10-CM

## 2017-09-06 DIAGNOSIS — G20 PARKINSON'S DISEASE: ICD-10-CM

## 2017-09-06 DIAGNOSIS — N40.0 BENIGN PROSTATIC HYPERPLASIA WITHOUT LOWER URINARY TRACT SYMPTOMS: ICD-10-CM

## 2017-09-06 DIAGNOSIS — N39.0 URINARY TRACT INFECTION, SITE NOT SPECIFIED: ICD-10-CM

## 2017-09-06 DIAGNOSIS — J84.10 PULMONARY FIBROSIS, UNSPECIFIED: ICD-10-CM

## 2017-09-06 DIAGNOSIS — K59.00 CONSTIPATION, UNSPECIFIED: ICD-10-CM

## 2017-09-06 DIAGNOSIS — R10.9 UNSPECIFIED ABDOMINAL PAIN: ICD-10-CM

## 2017-10-19 PROCEDURE — 96375 TX/PRO/DX INJ NEW DRUG ADDON: CPT

## 2017-10-19 PROCEDURE — 85027 COMPLETE CBC AUTOMATED: CPT

## 2017-10-19 PROCEDURE — 80048 BASIC METABOLIC PNL TOTAL CA: CPT

## 2017-10-19 PROCEDURE — 96374 THER/PROPH/DIAG INJ IV PUSH: CPT | Mod: XU

## 2017-10-19 PROCEDURE — 83690 ASSAY OF LIPASE: CPT

## 2017-10-19 PROCEDURE — 83735 ASSAY OF MAGNESIUM: CPT

## 2017-10-19 PROCEDURE — 81001 URINALYSIS AUTO W/SCOPE: CPT

## 2017-10-19 PROCEDURE — 93005 ELECTROCARDIOGRAM TRACING: CPT

## 2017-10-19 PROCEDURE — 99285 EMERGENCY DEPT VISIT HI MDM: CPT | Mod: 25

## 2017-10-19 PROCEDURE — 84484 ASSAY OF TROPONIN QUANT: CPT

## 2017-10-19 PROCEDURE — 84100 ASSAY OF PHOSPHORUS: CPT

## 2017-10-19 PROCEDURE — 83605 ASSAY OF LACTIC ACID: CPT

## 2017-10-19 PROCEDURE — 80053 COMPREHEN METABOLIC PANEL: CPT

## 2017-10-19 PROCEDURE — 87086 URINE CULTURE/COLONY COUNT: CPT

## 2017-10-19 PROCEDURE — 74177 CT ABD & PELVIS W/CONTRAST: CPT

## 2020-08-06 NOTE — PROGRESS NOTE ADULT - PROBLEM/PLAN-3
[facility] Medicine Daily Progress Note    Date of Service  8/6/2020    Chief Complaint  88 y.o. male admitted 8/3/2020 with   Chief Complaint   Patient presents with   • GLF     pt had GLF 1 week ago unknown LOC; c/o L sided chest wall pain and L arm and shoulder pain; blood glucose 170 per EMS         Hospital Course  This is a 88-year-old male with past medical history significant for hyperlipidemia, hypertension presented to ER on 8/4/2020 after he had a ground-level fall.  It is noted that patient had a ground-level fall 1 week ago, complaint of left-sided chest wall pain, left arm and shoulder pain.  He reports having said a ground-level fall, denied any dizziness.     Upon presentation in ER, he is noted to have macrocytosis with MCV of 106 , mildly elevated troponin at 38.  CT chest abdomen showed left clavicular fracture appears likely subacute.  ER.  Discussed with orthopedic surgery recommended following up as an outpatient.       CT head showed subtle area of low density in the right parieto-occipital subcortical white matter could represent vasogenic edema, MRI of brain with and without contrast ordered.  Will provide neurochecks every 4 hours, on aspiration, fall, seizure precaution.     Considering macrocytosis vitamin B12 was obtained noted to be low, will provide vitamin B12 thousand MCG p.o. daily; mildly elevated TSH at 7s , will start synthroid 25 mcg po qd.     Elevated troponin at 38, repeat 27.  Echo, EKG did not show any ST and T wave changes.  Obtain orthostatic vitals        Interval Problem Update  No acute events overnight.  Early in the morning patient noted to be agitated, placed on restrain  --- Troponin trending down, echo did not show any wall motion abnormality, EF 70%, moderate aortic stenosis      8/6:  Patient is noted to be agitated, on restrain, psych consulted for assistance  - MRI brain focal encephalomalacia in the right frontal lobe    ---- hypokalemia ta 3.3   --- provide  vitamin b12 IM   --- Ortho  surgery was consulted, WBAT    Consultants/Specialty  None     Code Status  Full Code    Disposition  TBD    Review of Systems  Review of Systems   Constitutional: Negative for diaphoresis.   HENT: Negative for sore throat.    Eyes: Negative for blurred vision and double vision.   Respiratory: Negative for cough and hemoptysis.    Cardiovascular: Negative for chest pain and palpitations.   Gastrointestinal: Negative for abdominal pain, nausea and vomiting.   Genitourinary: Negative for dysuria and urgency.   Musculoskeletal: Positive for joint pain (shoulder pain).   Neurological: Negative for headaches.   Psychiatric/Behavioral: Negative for depression.        Physical Exam  Temp:  [35.9 °C (96.7 °F)-37.2 °C (99 °F)] 35.9 °C (96.7 °F)  Pulse:  [76-98] 76  Resp:  [16-19] 16  BP: (131-157)/(62-86) 131/62  SpO2:  [95 %-97 %] 97 %    Physical Exam  Vitals signs and nursing note reviewed.   Constitutional:       Appearance: Normal appearance.   HENT:      Head: Normocephalic and atraumatic.   Eyes:      Extraocular Movements: Extraocular movements intact.   Neck:      Musculoskeletal: Neck supple.   Cardiovascular:      Rate and Rhythm: Normal rate and regular rhythm.   Pulmonary:      Effort: Pulmonary effort is normal.      Comments: Decreased breath sound at the bases  Abdominal:      General: Abdomen is flat. Bowel sounds are normal. There is distension.      Palpations: Abdomen is soft.   Musculoskeletal:         General: No swelling.   Skin:     General: Skin is warm.   Neurological:      Mental Status: He is alert.      Cranial Nerves: No cranial nerve deficit.      Comments: Alert and orinented x 2-3   Psychiatric:         Mood and Affect: Mood normal.         Fluids    Intake/Output Summary (Last 24 hours) at 8/6/2020 1438  Last data filed at 8/6/2020 1400  Gross per 24 hour   Intake 118 ml   Output 625 ml   Net -507 ml       Laboratory  Recent Labs     08/04/20  0906 08/05/20  6490  08/06/20  0334   WBC 7.6 5.5 6.7   RBC 4.12* 3.76* 3.84*   HEMOGLOBIN 15.3 13.7* 14.2   HEMATOCRIT 43.9 39.8* 40.4*   .6* 105.9* 105.2*   MCH 37.1* 36.4* 37.0*   MCHC 34.9 34.4 35.1   RDW 52.2* 49.0 48.5   PLATELETCT 173 162* 183   MPV 10.5 10.0 9.8     Recent Labs     08/04/20  0906 08/05/20  0239 08/06/20  0334   SODIUM 133* 133* 138   POTASSIUM 3.9 3.9 3.3*   CHLORIDE 99 99 105   CO2 25 24 23   GLUCOSE 120* 95 84   BUN 10 11 7*   CREATININE 0.68 0.59 0.69   CALCIUM 8.5 8.0* 8.0*             Recent Labs     08/05/20 0239   TRIGLYCERIDE 77   HDL 52   LDL 41       Imaging  MR-BRAIN-WITH & W/O   Final Result      1.  No acute abnormality.   2.  Moderate cerebral atrophy.   3.  Moderate chronic microvascular ischemic disease.   4.  There is no evidence of vasogenic edema. There is focal encephalomalacia in the right frontal lobe likely secondary to the previous brain insult.      EC-ECHOCARDIOGRAM COMPLETE W/O CONT   Final Result      CT-CHEST (THORAX) WITH   Final Result         1.  Bilateral peripheral reticular pulmonary opacities, appearance suggests component of fibrosis.   2.  Bilateral dependent atelectasis is seen.   3.  Left clavicular fracture, appears likely subacute.   4.  Atherosclerosis and atherosclerotic coronary artery disease      Fleischner Society pulmonary nodule recommendations:         CT-HEAD W/O   Final Result         1.  Subtle area of low-density in the right parieto-occipital subcortical white matter, could represent vasogenic edema. Recommend further characterization with contrast-enhanced CT head or MRI to exclude intracranial mass lesion.   2.  Nonspecific white matter changes, commonly associated with small vessel ischemic disease.  Associated mild cerebral atrophy is noted.   3.  Atherosclerosis.      DX-SHOULDER 2+ LEFT   Final Result         1.  No acute traumatic bony injury.   2.  Degenerative changes of the acromioclavicular and glenohumeral joints.               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   -- PT and OT    -- tylenol and toradol    Clavicular fracture  Assessment & Plan  Not acute sub-acute clavicular fracture on CT chest    --- discussed with Ortho     Dementia with behavioral disturbance (HCC)  Assessment & Plan  depakote 125 mg tid prn along with seroquel 25 mg po bid     Vasogenic edema (HCC)  Assessment & Plan  Suspected vasogenic edema, MRI brain focal encephalomalacia in the right frontal lobe    - neuro-check q 4 hs     Hyponatremia  Assessment & Plan  LIkely hypovolumic hyponatremia     Macrocytic anemia  Assessment & Plan  Likely 2/2 vitamin b12 deficiency    -- will provide  IM vit B12 followed by po     Vitamin B12 deficiency  Assessment & Plan  Will provide 1000mcg of vitamin b12   IM provided by po    Fall  Assessment & Plan   Pt and OT pending      Dementia with behavioral  Agitation:   ----  Will continue depakote and quetiapine     VTE prophylaxis: scd       DISPLAY PLAN FREE TEXT